# Patient Record
Sex: MALE | Race: WHITE | Employment: UNEMPLOYED | URBAN - METROPOLITAN AREA
[De-identification: names, ages, dates, MRNs, and addresses within clinical notes are randomized per-mention and may not be internally consistent; named-entity substitution may affect disease eponyms.]

---

## 2017-10-05 ENCOUNTER — HOSPITAL ENCOUNTER (OUTPATIENT)
Dept: CT IMAGING | Age: 62
Discharge: HOME | End: 2017-10-05
Attending: SURGERY
Payer: MEDICARE

## 2017-10-05 DIAGNOSIS — I65.29 OCCLUSION AND STENOSIS OF CAROTID ARTERY: ICD-10-CM

## 2017-10-05 DIAGNOSIS — I65.22 STENOSIS OF LEFT CAROTID ARTERY: ICD-10-CM

## 2017-10-05 PROBLEM — R53.1 LEFT-SIDED WEAKNESS: Status: ACTIVE | Noted: 2017-10-05

## 2017-10-05 PROBLEM — I10 HTN (HYPERTENSION): Status: ACTIVE | Noted: 2017-10-05

## 2017-10-05 PROBLEM — E11.9 DM (DIABETES MELLITUS) (HCC): Status: ACTIVE | Noted: 2017-10-05

## 2017-10-05 PROBLEM — I63.9 STROKE (HCC): Status: ACTIVE | Noted: 2017-10-05

## 2017-10-05 LAB — CREAT BLD-MCNC: 0.6 MG/DL (ref 0.8–1.5)

## 2017-10-05 PROCEDURE — 74011636320 HC RX REV CODE- 636/320: Performed by: SURGERY

## 2017-10-05 PROCEDURE — 70498 CT ANGIOGRAPHY NECK: CPT

## 2017-10-05 PROCEDURE — 74011000258 HC RX REV CODE- 258: Performed by: SURGERY

## 2017-10-05 PROCEDURE — 82565 ASSAY OF CREATININE: CPT

## 2017-10-05 RX ORDER — SODIUM CHLORIDE 0.9 % (FLUSH) 0.9 %
10 SYRINGE (ML) INJECTION
Status: COMPLETED | OUTPATIENT
Start: 2017-10-05 | End: 2017-10-05

## 2017-10-05 RX ADMIN — IOPAMIDOL 80 ML: 755 INJECTION, SOLUTION INTRAVENOUS at 14:23

## 2017-10-05 RX ADMIN — Medication 10 ML: at 14:23

## 2017-10-05 RX ADMIN — SODIUM CHLORIDE 100 ML: 900 INJECTION, SOLUTION INTRAVENOUS at 14:23

## 2017-10-10 ENCOUNTER — ANESTHESIA EVENT (OUTPATIENT)
Dept: SURGERY | Age: 62
DRG: 038 | End: 2017-10-10
Payer: MEDICARE

## 2017-10-10 ENCOUNTER — HOSPITAL ENCOUNTER (OUTPATIENT)
Dept: SURGERY | Age: 62
Discharge: HOME | End: 2017-10-10
Payer: MEDICARE

## 2017-10-10 VITALS
HEART RATE: 80 BPM | RESPIRATION RATE: 16 BRPM | TEMPERATURE: 99.8 F | DIASTOLIC BLOOD PRESSURE: 79 MMHG | HEIGHT: 69 IN | BODY MASS INDEX: 27.46 KG/M2 | OXYGEN SATURATION: 98 % | WEIGHT: 185.44 LBS | SYSTOLIC BLOOD PRESSURE: 140 MMHG

## 2017-10-10 LAB
ANION GAP SERPL CALC-SCNC: 6 MMOL/L (ref 7–16)
APTT PPP: 28.5 SEC (ref 23.5–31.7)
BUN SERPL-MCNC: 15 MG/DL (ref 8–23)
CALCIUM SERPL-MCNC: 9.5 MG/DL (ref 8.3–10.4)
CHLORIDE SERPL-SCNC: 107 MMOL/L (ref 98–107)
CO2 SERPL-SCNC: 27 MMOL/L (ref 21–32)
CREAT SERPL-MCNC: 0.53 MG/DL (ref 0.8–1.5)
ERYTHROCYTE [DISTWIDTH] IN BLOOD BY AUTOMATED COUNT: 12.6 % (ref 11.9–14.6)
GLUCOSE SERPL-MCNC: 58 MG/DL (ref 65–100)
HCT VFR BLD AUTO: 42.3 % (ref 41.1–50.3)
HGB BLD-MCNC: 13.9 G/DL (ref 13.6–17.2)
INR PPP: 1 (ref 0.9–1.2)
MCH RBC QN AUTO: 28.4 PG (ref 26.1–32.9)
MCHC RBC AUTO-ENTMCNC: 32.9 G/DL (ref 31.4–35)
MCV RBC AUTO: 86.3 FL (ref 79.6–97.8)
PLATELET # BLD AUTO: 356 K/UL (ref 150–450)
PMV BLD AUTO: 9.5 FL (ref 10.8–14.1)
POTASSIUM SERPL-SCNC: 4.1 MMOL/L (ref 3.5–5.1)
PROTHROMBIN TIME: 10.8 SEC (ref 9.6–12)
RBC # BLD AUTO: 4.9 M/UL (ref 4.23–5.67)
SODIUM SERPL-SCNC: 140 MMOL/L (ref 136–145)
WBC # BLD AUTO: 9 K/UL (ref 4.3–11.1)

## 2017-10-10 PROCEDURE — 93005 ELECTROCARDIOGRAM TRACING: CPT | Performed by: ANESTHESIOLOGY

## 2017-10-10 PROCEDURE — 85730 THROMBOPLASTIN TIME PARTIAL: CPT | Performed by: SURGERY

## 2017-10-10 PROCEDURE — 80048 BASIC METABOLIC PNL TOTAL CA: CPT | Performed by: SURGERY

## 2017-10-10 PROCEDURE — 85610 PROTHROMBIN TIME: CPT | Performed by: SURGERY

## 2017-10-10 PROCEDURE — 85027 COMPLETE CBC AUTOMATED: CPT | Performed by: SURGERY

## 2017-10-10 NOTE — ANESTHESIA PREPROCEDURE EVALUATION
Anesthetic History               Review of Systems / Medical History  Patient summary reviewed and pertinent labs reviewed    Pulmonary    COPD: moderate      Smoker (Continues to smoke)         Neuro/Psych       CVA (L sided weakness. Last CV 8/2017.  First CVA 2008)  Psychiatric history (Anxiety/Depression)     Cardiovascular    Hypertension: well controlled          PAD (Carotid stenosis) and hyperlipidemia    Exercise tolerance: <4 METS  Comments: Pt inactive  Denies CP   Poor balance per pt  Nml EF per note from general physician on recent ECHO   GI/Hepatic/Renal                Endo/Other    Diabetes: well controlled, type 2         Other Findings              Physical Exam    Airway  Mallampati: II  TM Distance: 4 - 6 cm  Neck ROM: normal range of motion   Mouth opening: Normal     Cardiovascular    Rhythm: regular  Rate: normal         Dental    Dentition: Poor dentition     Pulmonary  Breath sounds clear to auscultation               Abdominal  GI exam deferred       Other Findings            Anesthetic Plan    ASA: 4  Anesthesia type: general    Monitoring Plan: Arterial line      Induction: Intravenous  Anesthetic plan and risks discussed with: Patient

## 2017-10-10 NOTE — PERIOP NOTES
Patient verified name, , and surgery as listed in [facility]. Patient provided medical/health information and PTA medications to the best of their ability. TYPE  CASE:3  Orders per surgeon: were Received  Labs per surgeon: CBC,BMP,PT,PTT. Labs per anesthesia protocol: Type and Screen on DOS. EKG  :  Done today    Patient provided with and instructed on education handouts including Guide to Surgery, blood transfusions, pain management, and hand hygiene for the family and community, and [facility] brochure. Yovana Mist and instructions given per hospital policy. Instructed patient to continue previous medications as prescribed prior to surgery unless otherwise directed and to take the following medications the day of surgery according to anesthesia guidelines : Amlodipine, Aspirin, Carvedilol . Instructed patient to hold  the following medications: none. Original medication prescription bottles were visualized during patient appointment. Patient teach back successful and patient demonstrates knowledge of instruction.

## 2017-10-11 LAB
ATRIAL RATE: 74 BPM
CALCULATED P AXIS, ECG09: 71 DEGREES
CALCULATED R AXIS, ECG10: 31 DEGREES
CALCULATED T AXIS, ECG11: 32 DEGREES
DIAGNOSIS, 93000: NORMAL
P-R INTERVAL, ECG05: 134 MS
Q-T INTERVAL, ECG07: 380 MS
QRS DURATION, ECG06: 88 MS
QTC CALCULATION (BEZET), ECG08: 421 MS
VENTRICULAR RATE, ECG03: 74 BPM

## 2017-10-17 ENCOUNTER — HOSPITAL ENCOUNTER (INPATIENT)
Age: 62
LOS: 2 days | Discharge: HOME HEALTH | DRG: 038 | End: 2017-10-19
Attending: SURGERY | Admitting: SURGERY
Payer: MEDICARE

## 2017-10-17 ENCOUNTER — ANESTHESIA (OUTPATIENT)
Dept: SURGERY | Age: 62
DRG: 038 | End: 2017-10-17
Payer: MEDICARE

## 2017-10-17 PROBLEM — Z98.890 STATUS POST CAROTID ENDARTERECTOMY: Status: ACTIVE | Noted: 2017-10-17

## 2017-10-17 LAB
ABO + RH BLD: NORMAL
BLOOD GROUP ANTIBODIES SERPL: NORMAL
GLUCOSE BLD STRIP.AUTO-MCNC: 110 MG/DL (ref 65–100)
SPECIMEN EXP DATE BLD: NORMAL

## 2017-10-17 PROCEDURE — 77030010507 HC ADH SKN DERMBND J&J -B: Performed by: SURGERY

## 2017-10-17 PROCEDURE — 76010000174 HC OR TIME 3.5 TO 4 HR INTENSV-TIER 1: Performed by: SURGERY

## 2017-10-17 PROCEDURE — C1769 GUIDE WIRE: HCPCS | Performed by: SURGERY

## 2017-10-17 PROCEDURE — 03UJ0KZ SUPPLEMENT LEFT COMMON CAROTID ARTERY WITH NONAUTOLOGOUS TISSUE SUBSTITUTE, OPEN APPROACH: ICD-10-PCS | Performed by: SURGERY

## 2017-10-17 PROCEDURE — 86900 BLOOD TYPING SEROLOGIC ABO: CPT | Performed by: ANESTHESIOLOGY

## 2017-10-17 PROCEDURE — 77030005401 HC CATH RAD ARRO -A: Performed by: ANESTHESIOLOGY

## 2017-10-17 PROCEDURE — 65610000006 HC RM INTENSIVE CARE

## 2017-10-17 PROCEDURE — 03CN0Z6 EXTIRPATE MATTER FROM L EXT CAROTID, BIFURC, OPEN: ICD-10-PCS | Performed by: SURGERY

## 2017-10-17 PROCEDURE — 77030008477 HC STYL SATN SLP COVD -A: Performed by: ANESTHESIOLOGY

## 2017-10-17 PROCEDURE — 77030002519 HC INSRT CLMP FIBRA STLTH AMR -B: Performed by: SURGERY

## 2017-10-17 PROCEDURE — 77030013292 HC BOWL MX PRSM J&J -A: Performed by: ANESTHESIOLOGY

## 2017-10-17 PROCEDURE — 77030002987 HC SUT PROL J&J -B: Performed by: SURGERY

## 2017-10-17 PROCEDURE — 77030012406 HC DRN WND PENRS BARD -A: Performed by: SURGERY

## 2017-10-17 PROCEDURE — 77030011640 HC PAD GRND REM COVD -A: Performed by: SURGERY

## 2017-10-17 PROCEDURE — 77030008703 HC TU ET UNCUF COVD -A: Performed by: ANESTHESIOLOGY

## 2017-10-17 PROCEDURE — 74011250636 HC RX REV CODE- 250/636

## 2017-10-17 PROCEDURE — 77030032490 HC SLV COMPR SCD KNE COVD -B: Performed by: SURGERY

## 2017-10-17 PROCEDURE — 77030002986 HC SUT PROL J&J -A: Performed by: SURGERY

## 2017-10-17 PROCEDURE — 74011000250 HC RX REV CODE- 250

## 2017-10-17 PROCEDURE — C1768 GRAFT, VASCULAR: HCPCS | Performed by: SURGERY

## 2017-10-17 PROCEDURE — 77030018673: Performed by: SURGERY

## 2017-10-17 PROCEDURE — 77030002996 HC SUT SLK J&J -A: Performed by: SURGERY

## 2017-10-17 PROCEDURE — 03CJ0Z6 EXTIRPATE MATTER FROM L COM CAROTID, BIFURC, OPEN: ICD-10-PCS | Performed by: SURGERY

## 2017-10-17 PROCEDURE — 77030018824 HC SHNT CAR ARGY COVD -B: Performed by: SURGERY

## 2017-10-17 PROCEDURE — 77030010512 HC APPL CLP LIG J&J -C: Performed by: SURGERY

## 2017-10-17 PROCEDURE — 77030013968 HC SHNT CAR SUNDT INLC -D: Performed by: SURGERY

## 2017-10-17 PROCEDURE — 77030018836 HC SOL IRR NACL ICUM -A: Performed by: SURGERY

## 2017-10-17 PROCEDURE — 77030013794 HC KT TRNSDUC BLD EDWD -B: Performed by: ANESTHESIOLOGY

## 2017-10-17 PROCEDURE — 74011000250 HC RX REV CODE- 250: Performed by: SURGERY

## 2017-10-17 PROCEDURE — 77030031139 HC SUT VCRL2 J&J -A: Performed by: SURGERY

## 2017-10-17 PROCEDURE — 77030034888 HC SUT PROL 2 J&J -B: Performed by: SURGERY

## 2017-10-17 PROCEDURE — 77030019908 HC STETH ESOPH SIMS -A: Performed by: ANESTHESIOLOGY

## 2017-10-17 PROCEDURE — 74011250637 HC RX REV CODE- 250/637: Performed by: ANESTHESIOLOGY

## 2017-10-17 PROCEDURE — 74011250637 HC RX REV CODE- 250/637

## 2017-10-17 PROCEDURE — 74011000258 HC RX REV CODE- 258: Performed by: SURGERY

## 2017-10-17 PROCEDURE — 76060000038 HC ANESTHESIA 3.5 TO 4 HR: Performed by: SURGERY

## 2017-10-17 PROCEDURE — 74011250636 HC RX REV CODE- 250/636: Performed by: SURGERY

## 2017-10-17 PROCEDURE — 77030013567 HC DRN WND RESERV BARD -A: Performed by: SURGERY

## 2017-10-17 PROCEDURE — 76210000016 HC OR PH I REC 1 TO 1.5 HR: Performed by: SURGERY

## 2017-10-17 PROCEDURE — 77030014008 HC SPNG HEMSTAT J&J -C: Performed by: SURGERY

## 2017-10-17 PROCEDURE — 77030016570 HC BLNKT BAIR HGGR 3M -B: Performed by: ANESTHESIOLOGY

## 2017-10-17 PROCEDURE — 77030002970 HC SUT PLEDG TELE -A: Performed by: SURGERY

## 2017-10-17 PROCEDURE — 82962 GLUCOSE BLOOD TEST: CPT

## 2017-10-17 PROCEDURE — 74011250637 HC RX REV CODE- 250/637: Performed by: SURGERY

## 2017-10-17 PROCEDURE — 74011250636 HC RX REV CODE- 250/636: Performed by: ANESTHESIOLOGY

## 2017-10-17 PROCEDURE — 03CL0Z6 EXTIRPATE MATTER FROM L INT CAROTID, BIFURC, OPEN: ICD-10-PCS | Performed by: SURGERY

## 2017-10-17 PROCEDURE — 77030020782 HC GWN BAIR PAWS FLX 3M -B: Performed by: ANESTHESIOLOGY

## 2017-10-17 DEVICE — XENOSURE BIOLOGIC PATCH, 0.8CM X 8CM, EIFU
Type: IMPLANTABLE DEVICE | Site: NECK | Status: FUNCTIONAL
Brand: XENOSURE BIOLOGIC PATCH

## 2017-10-17 RX ORDER — SODIUM CHLORIDE 9 MG/ML
INJECTION, SOLUTION INTRAVENOUS
Status: DISCONTINUED | OUTPATIENT
Start: 2017-10-17 | End: 2017-10-17 | Stop reason: HOSPADM

## 2017-10-17 RX ORDER — ALBUTEROL SULFATE 90 UG/1
AEROSOL, METERED RESPIRATORY (INHALATION) AS NEEDED
Status: DISCONTINUED | OUTPATIENT
Start: 2017-10-17 | End: 2017-10-17 | Stop reason: HOSPADM

## 2017-10-17 RX ORDER — FAMOTIDINE 20 MG/1
20 TABLET, FILM COATED ORAL ONCE
Status: COMPLETED | OUTPATIENT
Start: 2017-10-17 | End: 2017-10-17

## 2017-10-17 RX ORDER — ACETAMINOPHEN 325 MG/1
650 TABLET ORAL
Status: DISCONTINUED | OUTPATIENT
Start: 2017-10-17 | End: 2017-10-19 | Stop reason: HOSPADM

## 2017-10-17 RX ORDER — HEPARIN SODIUM 1000 [USP'U]/ML
INJECTION, SOLUTION INTRAVENOUS; SUBCUTANEOUS AS NEEDED
Status: DISCONTINUED | OUTPATIENT
Start: 2017-10-17 | End: 2017-10-17 | Stop reason: HOSPADM

## 2017-10-17 RX ORDER — SODIUM CHLORIDE, SODIUM LACTATE, POTASSIUM CHLORIDE, CALCIUM CHLORIDE 600; 310; 30; 20 MG/100ML; MG/100ML; MG/100ML; MG/100ML
75 INJECTION, SOLUTION INTRAVENOUS CONTINUOUS
Status: DISCONTINUED | OUTPATIENT
Start: 2017-10-17 | End: 2017-10-17

## 2017-10-17 RX ORDER — DIPHENHYDRAMINE HYDROCHLORIDE 50 MG/ML
12.5 INJECTION, SOLUTION INTRAMUSCULAR; INTRAVENOUS
Status: DISCONTINUED | OUTPATIENT
Start: 2017-10-17 | End: 2017-10-17 | Stop reason: HOSPADM

## 2017-10-17 RX ORDER — HEPARIN SODIUM 5000 [USP'U]/ML
INJECTION, SOLUTION INTRAVENOUS; SUBCUTANEOUS AS NEEDED
Status: DISCONTINUED | OUTPATIENT
Start: 2017-10-17 | End: 2017-10-17 | Stop reason: HOSPADM

## 2017-10-17 RX ORDER — NEOSTIGMINE METHYLSULFATE 1 MG/ML
INJECTION INTRAVENOUS AS NEEDED
Status: DISCONTINUED | OUTPATIENT
Start: 2017-10-17 | End: 2017-10-17 | Stop reason: HOSPADM

## 2017-10-17 RX ORDER — DOPAMINE HYDROCHLORIDE 320 MG/100ML
5 INJECTION, SOLUTION INTRAVENOUS
Status: DISCONTINUED | OUTPATIENT
Start: 2017-10-17 | End: 2017-10-19

## 2017-10-17 RX ORDER — ONDANSETRON 2 MG/ML
INJECTION INTRAMUSCULAR; INTRAVENOUS AS NEEDED
Status: DISCONTINUED | OUTPATIENT
Start: 2017-10-17 | End: 2017-10-17 | Stop reason: HOSPADM

## 2017-10-17 RX ORDER — HYDROMORPHONE HYDROCHLORIDE 2 MG/ML
0.5 INJECTION, SOLUTION INTRAMUSCULAR; INTRAVENOUS; SUBCUTANEOUS
Status: DISCONTINUED | OUTPATIENT
Start: 2017-10-17 | End: 2017-10-17 | Stop reason: HOSPADM

## 2017-10-17 RX ORDER — SODIUM CHLORIDE 0.9 % (FLUSH) 0.9 %
5-10 SYRINGE (ML) INJECTION EVERY 8 HOURS
Status: DISCONTINUED | OUTPATIENT
Start: 2017-10-17 | End: 2017-10-19 | Stop reason: HOSPADM

## 2017-10-17 RX ORDER — ROCURONIUM BROMIDE 10 MG/ML
INJECTION, SOLUTION INTRAVENOUS AS NEEDED
Status: DISCONTINUED | OUTPATIENT
Start: 2017-10-17 | End: 2017-10-17 | Stop reason: HOSPADM

## 2017-10-17 RX ORDER — NICARDIPINE HYDROCHLORIDE 0.1 MG/ML
5-15 INJECTION INTRAVENOUS
Status: DISCONTINUED | OUTPATIENT
Start: 2017-10-17 | End: 2017-10-17 | Stop reason: SDUPTHER

## 2017-10-17 RX ORDER — ASPIRIN 81 MG/1
81 TABLET ORAL DAILY
Status: DISCONTINUED | OUTPATIENT
Start: 2017-10-18 | End: 2017-10-19 | Stop reason: HOSPADM

## 2017-10-17 RX ORDER — CEFAZOLIN SODIUM IN 0.9 % NACL 2 G/50 ML
2 INTRAVENOUS SOLUTION, PIGGYBACK (ML) INTRAVENOUS
Status: COMPLETED | OUTPATIENT
Start: 2017-10-17 | End: 2017-10-17

## 2017-10-17 RX ORDER — ACETAMINOPHEN 10 MG/ML
INJECTION, SOLUTION INTRAVENOUS AS NEEDED
Status: DISCONTINUED | OUTPATIENT
Start: 2017-10-17 | End: 2017-10-17 | Stop reason: HOSPADM

## 2017-10-17 RX ORDER — DEXTROSE MONOHYDRATE AND SODIUM CHLORIDE 5; .9 G/100ML; G/100ML
50 INJECTION, SOLUTION INTRAVENOUS CONTINUOUS
Status: DISCONTINUED | OUTPATIENT
Start: 2017-10-17 | End: 2017-10-19 | Stop reason: HOSPADM

## 2017-10-17 RX ORDER — SODIUM CHLORIDE, SODIUM LACTATE, POTASSIUM CHLORIDE, CALCIUM CHLORIDE 600; 310; 30; 20 MG/100ML; MG/100ML; MG/100ML; MG/100ML
75 INJECTION, SOLUTION INTRAVENOUS CONTINUOUS
Status: DISCONTINUED | OUTPATIENT
Start: 2017-10-17 | End: 2017-10-17 | Stop reason: HOSPADM

## 2017-10-17 RX ORDER — LIDOCAINE HYDROCHLORIDE 20 MG/ML
INJECTION, SOLUTION EPIDURAL; INFILTRATION; INTRACAUDAL; PERINEURAL AS NEEDED
Status: DISCONTINUED | OUTPATIENT
Start: 2017-10-17 | End: 2017-10-17 | Stop reason: HOSPADM

## 2017-10-17 RX ORDER — CARVEDILOL 25 MG/1
25 TABLET ORAL 2 TIMES DAILY WITH MEALS
Status: DISCONTINUED | OUTPATIENT
Start: 2017-10-17 | End: 2017-10-19 | Stop reason: HOSPADM

## 2017-10-17 RX ORDER — OXYCODONE HYDROCHLORIDE 5 MG/1
5 TABLET ORAL
Status: DISCONTINUED | OUTPATIENT
Start: 2017-10-17 | End: 2017-10-17 | Stop reason: HOSPADM

## 2017-10-17 RX ORDER — AMLODIPINE BESYLATE 10 MG/1
10 TABLET ORAL
Status: DISCONTINUED | OUTPATIENT
Start: 2017-10-18 | End: 2017-10-19 | Stop reason: HOSPADM

## 2017-10-17 RX ORDER — FENTANYL CITRATE 50 UG/ML
INJECTION, SOLUTION INTRAMUSCULAR; INTRAVENOUS AS NEEDED
Status: DISCONTINUED | OUTPATIENT
Start: 2017-10-17 | End: 2017-10-17 | Stop reason: HOSPADM

## 2017-10-17 RX ORDER — SODIUM CHLORIDE 0.9 % (FLUSH) 0.9 %
5-10 SYRINGE (ML) INJECTION AS NEEDED
Status: DISCONTINUED | OUTPATIENT
Start: 2017-10-17 | End: 2017-10-19 | Stop reason: HOSPADM

## 2017-10-17 RX ORDER — NALOXONE HYDROCHLORIDE 0.4 MG/ML
0.1 INJECTION, SOLUTION INTRAMUSCULAR; INTRAVENOUS; SUBCUTANEOUS
Status: DISCONTINUED | OUTPATIENT
Start: 2017-10-17 | End: 2017-10-17 | Stop reason: HOSPADM

## 2017-10-17 RX ORDER — ONDANSETRON 2 MG/ML
4 INJECTION INTRAMUSCULAR; INTRAVENOUS
Status: DISCONTINUED | OUTPATIENT
Start: 2017-10-17 | End: 2017-10-19 | Stop reason: HOSPADM

## 2017-10-17 RX ORDER — PROPOFOL 10 MG/ML
INJECTION, EMULSION INTRAVENOUS AS NEEDED
Status: DISCONTINUED | OUTPATIENT
Start: 2017-10-17 | End: 2017-10-17 | Stop reason: HOSPADM

## 2017-10-17 RX ORDER — OXYCODONE HYDROCHLORIDE 5 MG/1
10 TABLET ORAL
Status: DISCONTINUED | OUTPATIENT
Start: 2017-10-17 | End: 2017-10-17 | Stop reason: HOSPADM

## 2017-10-17 RX ORDER — LIDOCAINE HYDROCHLORIDE 10 MG/ML
0.1 INJECTION INFILTRATION; PERINEURAL AS NEEDED
Status: DISCONTINUED | OUTPATIENT
Start: 2017-10-17 | End: 2017-10-19 | Stop reason: HOSPADM

## 2017-10-17 RX ORDER — MORPHINE SULFATE 10 MG/ML
5 INJECTION, SOLUTION INTRAMUSCULAR; INTRAVENOUS
Status: DISCONTINUED | OUTPATIENT
Start: 2017-10-17 | End: 2017-10-19 | Stop reason: HOSPADM

## 2017-10-17 RX ORDER — FLUMAZENIL 0.1 MG/ML
0.2 INJECTION INTRAVENOUS AS NEEDED
Status: DISCONTINUED | OUTPATIENT
Start: 2017-10-17 | End: 2017-10-17 | Stop reason: HOSPADM

## 2017-10-17 RX ORDER — NALOXONE HYDROCHLORIDE 0.4 MG/ML
0.4 INJECTION, SOLUTION INTRAMUSCULAR; INTRAVENOUS; SUBCUTANEOUS AS NEEDED
Status: DISCONTINUED | OUTPATIENT
Start: 2017-10-17 | End: 2017-10-19 | Stop reason: HOSPADM

## 2017-10-17 RX ORDER — MIDAZOLAM HYDROCHLORIDE 1 MG/ML
2 INJECTION, SOLUTION INTRAMUSCULAR; INTRAVENOUS
Status: DISCONTINUED | OUTPATIENT
Start: 2017-10-17 | End: 2017-10-19 | Stop reason: HOSPADM

## 2017-10-17 RX ORDER — CEFAZOLIN SODIUM IN 0.9 % NACL 2 G/50 ML
2 INTRAVENOUS SOLUTION, PIGGYBACK (ML) INTRAVENOUS EVERY 8 HOURS
Status: COMPLETED | OUTPATIENT
Start: 2017-10-17 | End: 2017-10-18

## 2017-10-17 RX ORDER — BUPIVACAINE HYDROCHLORIDE 2.5 MG/ML
INJECTION, SOLUTION EPIDURAL; INFILTRATION; INTRACAUDAL AS NEEDED
Status: DISCONTINUED | OUTPATIENT
Start: 2017-10-17 | End: 2017-10-17 | Stop reason: HOSPADM

## 2017-10-17 RX ORDER — PROTAMINE SULFATE 10 MG/ML
INJECTION, SOLUTION INTRAVENOUS AS NEEDED
Status: DISCONTINUED | OUTPATIENT
Start: 2017-10-17 | End: 2017-10-17 | Stop reason: HOSPADM

## 2017-10-17 RX ORDER — GLYCOPYRROLATE 0.2 MG/ML
INJECTION INTRAMUSCULAR; INTRAVENOUS AS NEEDED
Status: DISCONTINUED | OUTPATIENT
Start: 2017-10-17 | End: 2017-10-17 | Stop reason: HOSPADM

## 2017-10-17 RX ORDER — LISINOPRIL 5 MG/1
5 TABLET ORAL
Status: DISCONTINUED | OUTPATIENT
Start: 2017-10-18 | End: 2017-10-19 | Stop reason: HOSPADM

## 2017-10-17 RX ORDER — OXYCODONE AND ACETAMINOPHEN 5; 325 MG/1; MG/1
1 TABLET ORAL
Status: DISCONTINUED | OUTPATIENT
Start: 2017-10-17 | End: 2017-10-19 | Stop reason: HOSPADM

## 2017-10-17 RX ADMIN — GLYCOPYRROLATE 0.2 MG: 0.2 INJECTION INTRAMUSCULAR; INTRAVENOUS at 14:31

## 2017-10-17 RX ADMIN — PROPOFOL 50 MG: 10 INJECTION, EMULSION INTRAVENOUS at 11:25

## 2017-10-17 RX ADMIN — DEXTROSE MONOHYDRATE AND SODIUM CHLORIDE 50 ML/HR: 5; .9 INJECTION, SOLUTION INTRAVENOUS at 17:55

## 2017-10-17 RX ADMIN — OXYCODONE HYDROCHLORIDE AND ACETAMINOPHEN 1 TABLET: 5; 325 TABLET ORAL at 21:58

## 2017-10-17 RX ADMIN — ROCURONIUM BROMIDE 40 MG: 10 INJECTION, SOLUTION INTRAVENOUS at 11:25

## 2017-10-17 RX ADMIN — HYDROMORPHONE HYDROCHLORIDE 0.5 MG: 2 INJECTION, SOLUTION INTRAMUSCULAR; INTRAVENOUS; SUBCUTANEOUS at 15:25

## 2017-10-17 RX ADMIN — FENTANYL CITRATE 50 MCG: 50 INJECTION, SOLUTION INTRAMUSCULAR; INTRAVENOUS at 12:52

## 2017-10-17 RX ADMIN — NEOSTIGMINE METHYLSULFATE 1.5 MG: 1 INJECTION INTRAVENOUS at 14:31

## 2017-10-17 RX ADMIN — FENTANYL CITRATE 50 MCG: 50 INJECTION, SOLUTION INTRAMUSCULAR; INTRAVENOUS at 11:56

## 2017-10-17 RX ADMIN — HEPARIN SODIUM 8000 UNITS: 1000 INJECTION, SOLUTION INTRAVENOUS; SUBCUTANEOUS at 12:22

## 2017-10-17 RX ADMIN — SODIUM CHLORIDE: 9 INJECTION, SOLUTION INTRAVENOUS at 11:30

## 2017-10-17 RX ADMIN — FENTANYL CITRATE 50 MCG: 50 INJECTION, SOLUTION INTRAMUSCULAR; INTRAVENOUS at 11:24

## 2017-10-17 RX ADMIN — Medication 10 ML: at 21:58

## 2017-10-17 RX ADMIN — Medication 10 ML: at 17:56

## 2017-10-17 RX ADMIN — ONDANSETRON 4 MG: 2 INJECTION INTRAMUSCULAR; INTRAVENOUS at 14:19

## 2017-10-17 RX ADMIN — CEFAZOLIN 2 G: 1 INJECTION, POWDER, FOR SOLUTION INTRAMUSCULAR; INTRAVENOUS; PARENTERAL at 11:32

## 2017-10-17 RX ADMIN — HEPARIN SODIUM 4000 UNITS: 1000 INJECTION, SOLUTION INTRAVENOUS; SUBCUTANEOUS at 13:07

## 2017-10-17 RX ADMIN — PROPOFOL 100 MG: 10 INJECTION, EMULSION INTRAVENOUS at 11:24

## 2017-10-17 RX ADMIN — PROTAMINE SULFATE 50 MG: 10 INJECTION, SOLUTION INTRAVENOUS at 14:05

## 2017-10-17 RX ADMIN — LIDOCAINE HYDROCHLORIDE 60 MG: 20 INJECTION, SOLUTION EPIDURAL; INFILTRATION; INTRACAUDAL; PERINEURAL at 11:24

## 2017-10-17 RX ADMIN — ACETAMINOPHEN 1000 MG: 10 INJECTION, SOLUTION INTRAVENOUS at 14:13

## 2017-10-17 RX ADMIN — SODIUM CHLORIDE, SODIUM LACTATE, POTASSIUM CHLORIDE, AND CALCIUM CHLORIDE 75 ML/HR: 600; 310; 30; 20 INJECTION, SOLUTION INTRAVENOUS at 09:16

## 2017-10-17 RX ADMIN — OXYCODONE HYDROCHLORIDE 10 MG: 5 TABLET ORAL at 16:53

## 2017-10-17 RX ADMIN — PROPOFOL 50 MG: 10 INJECTION, EMULSION INTRAVENOUS at 11:26

## 2017-10-17 RX ADMIN — ROCURONIUM BROMIDE 10 MG: 10 INJECTION, SOLUTION INTRAVENOUS at 11:28

## 2017-10-17 RX ADMIN — FENTANYL CITRATE 50 MCG: 50 INJECTION, SOLUTION INTRAMUSCULAR; INTRAVENOUS at 11:19

## 2017-10-17 RX ADMIN — CEFAZOLIN 2 G: 1 INJECTION, POWDER, FOR SOLUTION INTRAMUSCULAR; INTRAVENOUS; PARENTERAL at 20:16

## 2017-10-17 RX ADMIN — FAMOTIDINE 20 MG: 20 TABLET ORAL at 09:17

## 2017-10-17 RX ADMIN — ALBUTEROL SULFATE 3 PUFF: 90 AEROSOL, METERED RESPIRATORY (INHALATION) at 14:34

## 2017-10-17 RX ADMIN — SODIUM CHLORIDE: 9 INJECTION, SOLUTION INTRAVENOUS at 14:10

## 2017-10-17 NOTE — ANESTHESIA PROCEDURE NOTES
Arterial Line Placement    Start time: 10/17/2017 11:18 AM  End time: 10/17/2017 11:21 AM  Performed by: Naomi Encinas  Authorized by: Naomi Encinas     Pre-Procedure  Indications:  Arterial pressure monitoring  Preanesthetic Checklist: patient identified, risks and benefits discussed, anesthesia consent, site marked, patient being monitored, timeout performed and patient being monitored    Timeout Time: 11:17        Procedure:   Prep:  Chlorhexidine  Seldinger Technique?: Yes    Orientation:  Left  Location:  Radial artery  Catheter size:  20 G  Number of attempts:  1  Cont Cardiac Output Sensor: No      Assessment:   Post-procedure:  Line secured and sterile dressing applied  Patient Tolerance:  Patient tolerated the procedure well with no immediate complications

## 2017-10-17 NOTE — OP NOTES
[facility]   OPERATIVE REPORT       Name:  Shirley Montero   MR#:  [id number]   :  1955   Account #:  [de-identified]   Date of Adm:  10/17/2017       DATE OF PROCEDURE: 10/17/2017     PREOPERATIVE DIAGNOSIS: Left carotid stenosis. POSTOPERATIVE DIAGNOSIS: Left carotid stenosis. PROCEDURE: Left carotid endarterectomy with bovine pericardial   patch angioplasty. SURGEON: Zaire Walker MD.     [facility]. ANESTHESIA: General endotracheal.    ESTIMATED BLOOD LOSS: 100 mL. DRAINS: A #15 GERMAINE. SPECIMENS: None. COMPLICATIONS: None. DESCRIPTION OF PROCEDURE: The patient was brought to the   operating room and placed on the operating table in supine   position. Following adequate general endotracheal anesthesia and   a time-out procedure, left neck was draped and prepped in   sterile fashion. An oblique incision was made along the anterior   border of the sternocleidomastoid muscle. The wound was deepened   using Bovie to control bleeding. The platysma was divided   throughout the length of the wound. The dissection was carried   down to the level of the internal jugular vein. The facial   branch of the internal jugular vein was then divided between   silk ties. Next, the carotid sheath was entered. The common   carotid, internal carotid, and external carotid arteries were   identified, mobilized, encircled with vessel loops. The plaque   extending distally in the internal carotid artery extended for   approximately 5 cm beyond the bifurcation requiring division of   the digastric muscle for exposure. The hypoglossal nerve was   identified during this procedure and was encircled with vessel   loop, so that we would not lose track of its positioning during   the procedure. The patient was then systemically heparinized. Next, the ICA, ECA and common carotid arteries were sequentially   occluded.  A longitudinal arteriotomy was performed of the common   carotid artery and extended across the bifurcation onto the   internal carotid artery. The arteriotomy was extended for   several centimeters to the point where we were beyond the level   of significant plaque. A 10-[place] shunt was then placed   in the internal carotid artery and then proximally in the common   carotid artery. Next, the plaque was then mobilized   circumferentially and divided at the proximal end of the   arteriotomy. The plaque was then mobilized moving proximal to   distal. The plaque extending into the external carotid artery   was removed the eversion technique. The remainder of the plaque   was mobilized distally to the point where we a smooth endpoint   was achieved. The endpoint was secured with interrupted 7-0   tacking sutures. The endarterectomy site was then manually   debrided of all movable debris. Once I was satisfied there was   no further debris present, the bovine pericardial patch was   brought onto the field and sewn in position in end-to-side   fashion with running 6-0 Prolene suture, brought out from either   end. Prior to completion of the closure, the shunt was removed   and native vessels were flushed and backbled. The closure was   then completed and flow was restored to the external carotid   artery, and then to the internal carotid artery. Flow was   confirmed by Doppler. At this point, protamine was administered,   and hemostasis was achieved. A 15 GERMAINE drain was placed. The wound   was then irrigated again and inspected for bleeding and there   was none seen. The wound was then closed in layers of Vicryl   suture. Sterile dry dressings were applied. The patient was   awakened from anesthesia and transferred to the recovery room in   stable condition. The patient tolerated the procedure well. No   complications. All needle and sponge counts were correct.         Wynelle Severin, MD Dan Jumper / Monisha Barragan   D:  10/17/2017   14:58   T:  10/17/2017   15:18   Job #:  [id number]

## 2017-10-17 NOTE — PERIOP NOTES
Type and screen confirmation drawn by Willy Angry, PCT and hand transported to blood bank. Per [place] in the Blood Bank, no Blood Band # required on label for type and screen confirmation tube.

## 2017-10-17 NOTE — PROGRESS NOTES
Dual skin assessment completed - left neck incision with drsg c/d/i with GERMAINE bulb charged. Left lateral upper arm with scab and portion with scab removed - pt states he fell at home.

## 2017-10-17 NOTE — PROGRESS NOTES
TRANSFER - IN REPORT:    Verbal report received from Ion Spring RN on [employer]  being received from PACU for routine progression of care      Report consisted of patients Situation, Background, Assessment and   Recommendations(SBAR). Information from the following report(s) SBAR, Kardex, OR Summary, Procedure Summary, Intake/Output, MAR, Accordion, Recent Results, Med Rec Status and Cardiac Rhythm NSR was reviewed with the receiving nurse. Opportunity for questions and clarification was provided. Assessment completed upon patients arrival to unit and care assumed.

## 2017-10-17 NOTE — IP AVS SNAPSHOT
[address] 
[phone] Patient: Mauricio Tse MRN: [id number] QWE:[id number] Current Discharge Medication List  
  
START taking these medications Dose & Instructions Dispensing Information Comments Morning Noon Evening Bedtime  
 nicotine 21 mg/24 hr  
Commonly known as:  Qian Brewster Start taking on:  10/20/2017 Your last dose was: This morning 10/19 Your next dose is:  Tomorrow morning 10/20 Notes to Patient: This is for smoking cessation Dose:  1 Patch 1 Patch by TransDERmal route daily for 30 days. Quantity:  30 Patch Refills:  0  
     
   
   
   
  
 traMADol 50 mg tablet Commonly known as:  ULTRAM  
Your next dose is: Take today if needed Dose:  50 mg Take 1 Tab by mouth every six (6) hours as needed for Pain. Max Daily Amount: 200 mg. Quantity:  30 Tab Refills:  0 CONTINUE these medications which have NOT CHANGED Dose & Instructions Dispensing Information Comments Morning Noon Evening Bedtime  
 albuterol 90 mcg/actuation inhaler Commonly known as:  PROVENTIL HFA, VENTOLIN HFA, PROAIR HFA Your next dose is:  Resume home schedule Take  by inhalation. Refills:  0 AMARYL 2 mg tablet Generic drug:  glimepiride Your last dose was: This morning 10/19 Your next dose is:  Tomorrow morning 10/20 Take  by mouth every morning. Refills:  0  
     
  
   
   
   
  
 amLODIPine 10 mg tablet Commonly known as:  Ila Colon Your last dose was: This morning 10/19 Your next dose is:  Tomorrow morning 10/20 Dose:  10 mg Take 10 mg by mouth every morning. Refills:  0  
     
  
   
   
   
  
 aspirin delayed-release 81 mg tablet Your last dose was: This morning 10/19 Your next dose is:  Tomorrow morning 10/20 Dose:  81 mg Take 81 mg by mouth every morning. Refills:  0 COREG 25 mg tablet Generic drug:  carvedilol Your last dose was: This morning 10/19 Your next dose is: This evening 10/19 Dose:  25 mg Take 25 mg by mouth two (2) times daily (with meals). Refills:  0  
     
  
   
   
  
   
  
 gabapentin 300 mg capsule Commonly known as:  NEURONTIN Your next dose is: Take today if needed Dose:  300 mg Take 300 mg by mouth as needed. Refills:  0 LIPITOR 40 mg tablet Generic drug:  atorvastatin Your last dose was: Last night 10/18 Your next dose is: Take tonight 10/19 Dose:  40 mg Take 40 mg by mouth nightly. Refills:  0  
     
   
   
   
  
  
 lisinopril 5 mg tablet Commonly known as:  Robertha Roup Your last dose was: This morning 10/19 Your next dose is:  Tomorrow morning 10/20 Dose:  5 mg Take 5 mg by mouth every morning. Refills:  0  
     
  
   
   
   
  
 metFORMIN 500 mg tablet Commonly known as:  GLUCOPHAGE Your last dose was: This morning 10/19 Your next dose is: This evening 10/19 Dose:  1000 mg Take 1,000 mg by mouth two (2) times daily (with meals). Refills:  0 NORCO 7.5-325 mg per tablet Generic drug:  HYDROcodone-acetaminophen Notes to Patient:  DO NOT TAKE WITH PERCOCET Dose:  1 Tab Take 1 Tab by mouth every six (6) hours as needed. Refills:  0  
     
   
   
   
  
 ZANAFLEX 4 mg capsule Generic drug:  tiZANidine Your next dose is: Take today if needed Dose:  4 mg Take 4 mg by mouth as needed. Refills:  0 Where to Get Your Medications Information on where to get these meds will be given to you by the nurse or doctor. ! Ask your nurse or doctor about these medications  
  nicotine 21 mg/24 hr  
 traMADol 50 mg tablet

## 2017-10-17 NOTE — PROGRESS NOTES
Bedside shift change report given to [employer] (oncoming nurse) by Holly Coley RN   (offgoing nurse). Report included the following information SBAR, Kardex, OR Summary, Intake/Output, MAR, Accordion, Recent Results, Med Rec Status and Cardiac Rhythm NSR.

## 2017-10-17 NOTE — PERIOP NOTES
Patient has not voided since before surgery, unable to void after several attempts. Inserted campos per standing order.

## 2017-10-17 NOTE — ANESTHESIA PROCEDURE NOTES
Arterial Line Placement    Start time: 10/17/2017 11:23 AM  End time: 10/17/2017 11:25 AM  Performed by: Tia Hatfield by: Sandra Owens     Pre-Procedure  Preanesthetic Checklist: patient identified, risks and benefits discussed, anesthesia consent, site marked, patient being monitored, timeout performed and patient being monitored    Timeout Time: 11:23        Procedure:   Prep:  Alcohol and ChloraPrep  Seldinger Technique?: No    Orientation:  Left  Location:  Radial artery  Catheter size:  20 G  Number of attempts:  1  Cont Cardiac Output Sensor: No      Assessment:   Post-procedure:  Line secured and sterile dressing applied  Patient Tolerance:  Patient tolerated the procedure well with no immediate complications  Comment:   Placed by CONCHIS

## 2017-10-17 NOTE — ANESTHESIA POSTPROCEDURE EVALUATION
Post-Anesthesia Evaluation and Assessment    Patient: Makeda Fam MRN: [id number]  SSN: [id number]    YOB: 1955  Age: 60 y.o. Sex: male       Cardiovascular Function/Vital Signs  Visit Vitals    BP (P) 111/62 (BP 1 Location: Right arm, BP Patient Position: At rest;Supine)    Pulse 69    Temp (P) 36.9 °C (98.5 °F)    Resp 18    Ht 5' 9\" (1.753 m)    Wt 82.2 kg (181 lb 3.2 oz)    SpO2 97%    BMI 26.76 kg/m2       Patient is status post general anesthesia for Procedure(s):  LEFT CAROTID ENDARTERECTOMY. Nausea/Vomiting: None    Postoperative hydration reviewed and adequate. Pain:  Pain Scale 1: Numeric (0 - 10) (10/17/17 1456)  Pain Intensity 1: 0 (10/17/17 1456)   Managed    Neurological Status:   Neuro (WDL): Exceptions to WDL (10/17/17 1456)  Neuro  Neurologic State: Drowsy (10/17/17 1456)  LUE Motor Response: Purposeful (10/17/17 1456)  LLE Motor Response: Purposeful (10/17/17 1456)  RUE Motor Response: Purposeful (10/17/17 1456)  RLE Motor Response: Purposeful (10/17/17 1456)   At baseline    Mental Status and Level of Consciousness: Arousable    Pulmonary Status:   O2 Device: (P) Nasal cannula (10/17/17 1555)   Adequate oxygenation and airway patent    Complications related to anesthesia: None    Post-anesthesia assessment completed.  No concerns    Signed By: Leonel Bateman MD     October 17, 2017

## 2017-10-17 NOTE — BRIEF OP NOTE
BRIEF OPERATIVE NOTE    Date of Procedure: 10/17/2017   Preoperative Diagnosis: Carotid stenosis, left [I65.22]  Postoperative Diagnosis: Carotid stenosis, left [I65.22]    Procedure(s):  LEFT CAROTID ENDARTERECTOMY WITH PATCH ANGIOPLASTY  Surgeon(s) and Role:     * Ashwini Hale MD - Primary     * Arun Caldwell MD - Assisting         Assistant Staff:       Surgical Staff:  Circ-1: Cathie Burns RN  Circ-Relief: Timothy Barker RN  Scrub Tech-1: Herb [place]  Scrub Tech-2: Excell Serve Kwong  Scrub Tech-Relief: Elham Claire Pyhala  Event Time In   Incision Start 1155   Incision Close      Anesthesia: General   Estimated Blood Loss: 100CC  Specimens: * No specimens in log *   Findings:    Complications: NONE  Implants:   Implant Name Type Inv.  Item Serial No.  Lot No. LRB No. Used [facility] VASC PERIPATCH [id number] -- Taylor Chaney - [id number]   PATCH VASC PERIPATCH [id number] -- [employer] [facility] [employer] [id number] Left 1 Implanted

## 2017-10-17 NOTE — PERIOP NOTES
TRANSFER - OUT REPORT:    Verbal report given to Andrez Franco RN  on [employer]  being transferred to [facility]  for routine progression of care       Report consisted of patients Situation, Background, Assessment and   Recommendations(SBAR). Information from the following report(s) SBAR, OR Summary and MAR was reviewed with the receiving nurse. Lines:   Peripheral IV 10/17/17 Right Hand (Active)   Site Assessment Clean, dry, & intact 10/17/2017  3:55 PM   Phlebitis Assessment 0 10/17/2017  3:55 PM   Infiltration Assessment 0 10/17/2017  3:55 PM   Dressing Status Clean, dry, & intact 10/17/2017  3:55 PM   Dressing Type Transparent;Tape 10/17/2017  3:55 PM   Hub Color/Line Status Capped;Green 10/17/2017  3:55 PM       Peripheral IV 10/17/17 Left Forearm (Active)   Site Assessment Clean, dry, & intact 10/17/2017  3:55 PM   Phlebitis Assessment 0 10/17/2017  3:55 PM   Infiltration Assessment 0 10/17/2017  3:55 PM   Dressing Status Clean, dry, & intact 10/17/2017  3:55 PM   Dressing Type Transparent;Tape 10/17/2017  3:55 PM   Hub Color/Line Status Infusing;Green 10/17/2017  3:55 PM       Arterial Line 10/17/17 Left Radial artery (Active)   Dressing Status Clean, dry, & intact 10/17/2017  3:55 PM   Dressing Type Transparent;Tape 10/17/2017  3:55 PM   Line Status Intact and in place 10/17/2017  3:55 PM   Treatment Zeroed or re-zeroed 10/17/2017  3:55 PM        Opportunity for questions and clarification was provided. Patient transported with:   O2 @ 3 liters    VTE prophylaxis orders have been written for [employer]. Patient and family given floor number and nurses name. Family updated re: pt status after security code verified.

## 2017-10-17 NOTE — IP AVS SNAPSHOT
[address] 
[phone] Patient: Erika Wilder MRN: [id number] DANIKA:[birth year] You are allergic to the following Allergen Reactions Pravastatin Nausea and Vomiting Immunizations Administered for This Admission Name Date Influenza Vaccine (Quad) PF 10/19/2017 Recent Documentation Height Weight BMI Smoking Status 1.753 m 82.2 kg 26.76 kg/m2 Current Every Day Smoker Emergency Contacts Name Discharge Info Relation Home Work Mobile Irene Moise  Sister [23] [phone] About your hospitalization You were admitted on:  October 17, 2017 You last received care in the:  [facility] 7 MED SURG You were discharged on:  October 19, 2017 Unit phone number:  [phone] Why you were hospitalized Your primary diagnosis was:  Carotid Stenosis Your diagnoses also included:  Status Post Carotid Endarterectomy, Dm (Diabetes Mellitus) (Hcc), Htn (Hypertension) Providers Seen During Your Hospitalizations Provider Role Specialty Primary office phone Jaime Dodge MD Attending Provider Vascular Surgery [phone] Your Primary Care Physician (PCP) Primary Care Physician Office Phone Office Fax OTHER, PHYS ** None ** ** None ** Follow-up Information Follow up With Details Comments Contact Info Jaime Dodge MD On 11/16/2017 follow up appointment at 1 : 00 PM [address] [facility] [address] 
[phone] [address]  Will contact you to schedule first home visit [facility] [address] [facility] [address] 
[phone] Your Appointments Thursday November 16, 2017  1:00 PM EST Carotid Artery Ultrasound with [facility] ([facility] 0464 [facility] [address] [place] [id number] [phone] Thursday November 16, 2017  1:45 PM EST Global Post Op with Grisel Childers MD  
[facility] ([facility]) [address] [place] [id number] [phone] Current Discharge Medication List  
  
START taking these medications Dose & Instructions Dispensing Information Comments Morning Noon Evening Bedtime  
 nicotine 21 mg/24 hr  
Commonly known as:  Suma Gabriel Start taking on:  10/20/2017 Your last dose was: This morning 10/19 Your next dose is:  Tomorrow morning 10/20 Notes to Patient: This is for smoking cessation Dose:  1 Patch 1 Patch by TransDERmal route daily for 30 days. Quantity:  30 Patch Refills:  0  
     
   
   
   
  
 traMADol 50 mg tablet Commonly known as:  ULTRAM  
Your next dose is: Take today if needed Dose:  50 mg Take 1 Tab by mouth every six (6) hours as needed for Pain. Max Daily Amount: 200 mg. Quantity:  30 Tab Refills:  0 CONTINUE these medications which have NOT CHANGED Dose & Instructions Dispensing Information Comments Morning Noon Evening Bedtime  
 albuterol 90 mcg/actuation inhaler Commonly known as:  PROVENTIL HFA, VENTOLIN HFA, PROAIR HFA Your next dose is:  Resume home schedule Take  by inhalation. Refills:  0 AMARYL 2 mg tablet Generic drug:  glimepiride Your last dose was: This morning 10/19 Your next dose is:  Tomorrow morning 10/20 Take  by mouth every morning. Refills:  0  
     
  
   
   
   
  
 amLODIPine 10 mg tablet Commonly known as:  Sadaf Marques Your last dose was: This morning 10/19 Your next dose is:  Tomorrow morning 10/20 Dose:  10 mg Take 10 mg by mouth every morning. Refills:  0  
     
  
   
   
   
  
 aspirin delayed-release 81 mg tablet Your last dose was: This morning 10/19 Your next dose is:  Tomorrow morning 10/20 Dose:  81 mg Take 81 mg by mouth every morning. Refills:  0 COREG 25 mg tablet Generic drug:  carvedilol Your last dose was: This morning 10/19 Your next dose is: This evening 10/19 Dose:  25 mg Take 25 mg by mouth two (2) times daily (with meals). Refills:  0  
     
  
   
   
  
   
  
 gabapentin 300 mg capsule Commonly known as:  NEURONTIN Your next dose is: Take today if needed Dose:  300 mg Take 300 mg by mouth as needed. Refills:  0 LIPITOR 40 mg tablet Generic drug:  atorvastatin Your last dose was: Last night 10/18 Your next dose is: Take tonight 10/19 Dose:  40 mg Take 40 mg by mouth nightly. Refills:  0  
     
   
   
   
  
  
 lisinopril 5 mg tablet Commonly known as:  Ashley Loss Your last dose was: This morning 10/19 Your next dose is:  Tomorrow morning 10/20 Dose:  5 mg Take 5 mg by mouth every morning. Refills:  0  
     
  
   
   
   
  
 metFORMIN 500 mg tablet Commonly known as:  GLUCOPHAGE Your last dose was: This morning 10/19 Your next dose is: This evening 10/19 Dose:  1000 mg Take 1,000 mg by mouth two (2) times daily (with meals). Refills:  0 NORCO 7.5-325 mg per tablet Generic drug:  HYDROcodone-acetaminophen Notes to Patient:  DO NOT TAKE WITH PERCOCET Dose:  1 Tab Take 1 Tab by mouth every six (6) hours as needed. Refills:  0  
     
   
   
   
  
 ZANAFLEX 4 mg capsule Generic drug:  tiZANidine Your next dose is: Take today if needed Dose:  4 mg Take 4 mg by mouth as needed. Refills:  0 Where to Get Your Medications Information on where to get these meds will be given to you by the nurse or doctor. ! Ask your nurse or doctor about these medications  
  nicotine 21 mg/24 hr  
 traMADol 50 mg tablet Discharge Instructions Report to Dr Yenifer Danielson temp 100.5 or greater , redness, swelling or drainage from incision Dressing:may remove dressing tomorrow and leave incision open to air May shower but no tub bathing No heavy lifting or strenuous activity for 6 weeks No driving until directed by doctor Notify surgeon of  pain not controlled by pain medication Carotid Endarterectomy: What to Expect at [facility] Your Recovery A carotid endarterectomy (say \"kuh-RAW-melvind pv-gbs-fgh-REK-tuh-sara\") is surgery to remove fatty build-up (plaque) from one of the carotid arteries. There are two carotid arteriesone on each side of the neckthat supply blood to the brain. When plaque builds up in either artery, it can make it hard for blood to flow to the brain. This surgery may lower your risk of having a stroke. You may have a sore throat for a few days. You can expect the cut (incision) in your neck to be sore for about a week. The area around the incision may also be swollen and bruised at first. The area in front of the incision may be numb. This usually gets better after 6 to 12 months. Your doctor closed the incision in your neck with stitches. The stitches will be removed 7 to 10 days after surgery, or you may have stitches that dissolve on their own. You may feel more tired than usual for several weeks after surgery. You will probably be able to go back to work or your usual activities in 1 to 2 weeks. This care sheet gives you a general idea about how long it will take for you to recover. But each person recovers at a different pace. Follow the steps below to feel better as quickly as possible. How can you care for yourself at home? Activity · Rest when you feel tired. Getting enough sleep will help you recover. · Try to walk each day. Start by walking a little more than you did the day before. Bit by bit, increase the amount you walk. Walking boosts blood flow and helps prevent pneumonia and constipation. · Avoid strenuous activities, such as bicycle riding, jogging, weight lifting, or aerobic exercise. Your doctor will tell you when it's okay to do strenuous activity. · For 1 to 2 weeks, avoid lifting anything that would make you strain. This may include a child, heavy grocery bags and milk containers, a heavy briefcase or backpack, cat litter or dog food bags, or a vacuum . · Ask your doctor when you can drive again. · You will probably need to take 1 to 2 weeks off from work. It depends on the type of work you do and how you feel. · You may shower and take baths as usual. But do not soak the incision for the first 2 weeks, or until your doctor tells you it is okay. Pat the incision dry. · Your doctor will tell you when you can have sex again. Diet · You can eat your normal diet. If your stomach is upset, try bland, low-fat foods like plain rice, broiled chicken, toast, and yogurt. · Drink plenty of fluids (unless your doctor tells you not to). · You may notice that your bowel movements are not regular right after your surgery. This is common. Try to avoid constipation and straining with bowel movements. You may want to take a fiber supplement every day. If you have not had a bowel movement after a couple of days, ask your doctor about taking a mild laxative. Medicines · Your doctor will tell you if and when you can restart your medicines. He or she will also give you instructions about taking any new medicines. · If you take blood thinners, such as warfarin (Coumadin), clopidogrel (Plavix), or aspirin, be sure to talk to your doctor. He or she will tell you if and when to start taking those medicines again. Make sure that you understand exactly what your doctor wants you to do. · Your doctor may advise you to take aspirin when you go home. This helps prevent blood clots. Take your medicines exactly as prescribed. Call your doctor if you think you are having a problem with your medicine. · Be safe with medicines. Take pain medicines exactly as directed. ¨ If the doctor gave you a prescription medicine for pain, take it as prescribed. ¨ If you are not taking a prescription pain medicine, ask your doctor if you can take an over-the-counter medicine. ¨ Do not take two or more pain medicines at the same time unless the doctor told you to. Many pain medicines have acetaminophen, which is Tylenol. Too much acetaminophen (Tylenol) can be harmful. · If you think your pain medicine is making you sick to your stomach: 
¨ Take your medicine after meals (unless your doctor has told you not to). ¨ Ask your doctor for a different pain medicine. · If your doctor prescribed antibiotics, take them as directed. Do not stop taking them just because you feel better. You need to take the full course of antibiotics. · If you take a blood thinner, such as aspirin, be sure you get instructions about how to take your medicine safely. Blood thinners can cause serious bleeding problems. Incision care · If you have strips of tape over your incision, leave the tape on for a week or until it falls off. · Wash the area daily with water and pat it dry. Other cleaning products, such as hydrogen peroxide, can make the wound heal more slowly. You may cover the area with a gauze bandage if it weeps or rubs against clothing. Change the bandage every day. · Keep the area clean and dry. Follow-up care is a key part of your treatment and safety. Be sure to make and go to all appointments, and call your doctor if you are having problems. It's also a good idea to know your test results and keep a list of the medicines you take. When should you call for help? Call 911 anytime you think you may need emergency care. For example, call if: 
· You passed out (lost consciousness). · You have severe trouble breathing. · You have a tight bulge in your neck on the side where the surgery was done. · You have symptoms of a stroke. These may include: 
¨ Sudden numbness, tingling, weakness, or loss of movement in your face, arm, or leg, especially on only one side of your body. ¨ Sudden vision changes. ¨ Sudden trouble speaking. ¨ Sudden confusion or trouble understanding simple statements. ¨ Sudden problems with walking or balance. ¨ A sudden, severe headache that is different from past headaches. · You have chest pain or pressure. This may occur with: ¨ Sweating. ¨ Shortness of breath. ¨ Nausea or vomiting. ¨ Pain that spreads from the chest to the neck, jaw, or one or both shoulders or arms. ¨ Dizziness or lightheadedness. ¨ A fast or uneven pulse. After calling 911, chew 1 adult-strength or 2 to 4 low-dose aspirin. Wait for an ambulance. Do not try to drive yourself. Call your doctor now or seek immediate medical care if: 
· You are sick to your stomach or cannot keep fluids down. · You have pain that does not get better after you take pain medicine. · You have a fever over 100°F. 
· You have loose stitches, or your incision comes open. · Bright red blood has soaked through the bandage over your incision. · You have signs of infection, such as: 
¨ Increased pain, swelling, warmth, or redness. ¨ Red streaks leading from the incisions. ¨ Pus draining from the incisions. ¨ Swollen lymph nodes in your neck, armpits, or groin. ¨ A fever. Watch closely for any changes in your health, and be sure to contact your doctor if you have any problems. Where can you learn more? Go to http://cindy-bella.info/. Enter A178 in the search box to learn more about \"Carotid Endarterectomy: What to Expect at Home. \" Current as of: April 3, 2017 Content Version: 11.3 © 7517-7563 [employer]. Care instructions adapted under license by [employer] (which disclaims liability or warranty for this information). If you have questions about a medical condition or this instruction, always ask your healthcare professional. [address] any warranty or liability for your use of this information. Stopping Smoking: Care Instructions Your Care Instructions Cigarette smokers crave the nicotine in cigarettes. Giving it up is much harder than simply changing a habit. Your body has to stop craving the nicotine. It is hard to quit, but you can do it. There are many tools that people use to quit smoking. You may find that combining tools works best for you. There are several steps to quitting. First you get ready to quit. Then you get support to help you. After that, you learn new skills and behaviors to become a nonsmoker. For many people, a necessary step is getting and using medicine. Your doctor will help you set up the plan that best meets your needs. You may want to attend a smoking cessation program to help you quit smoking. When you choose a program, look for one that has proven success. Ask your doctor for ideas. You will greatly increase your chances of success if you take medicine as well as get counseling or join a cessation program. 
Some of the changes you feel when you first quit tobacco are uncomfortable. Your body will miss the nicotine at first, and you may feel short-tempered and grumpy. You may have trouble sleeping or concentrating. Medicine can help you deal with these symptoms. You may struggle with changing your smoking habits and rituals. The last step is the tricky one: Be prepared for the smoking urge to continue for a time. This is a lot to deal with, but keep at it. You will feel better. Follow-up care is a key part of your treatment and safety.  Be sure to make and go to all appointments, and call your doctor if you are having problems. Its also a good idea to know your test results and keep a list of the medicines you take. How can you care for yourself at home? · Ask your family, friends, and coworkers for support. You have a better chance of quitting if you have help and support. · Join a support group, such as Nicotine Anonymous, for people who are trying to quit smoking. · Consider signing up for a smoking cessation program, such as the American Lung Association's Freedom from Smoking program. 
· Set a quit date. Pick your date carefully so that it is not right in the middle of a big deadline or stressful time. Once you quit, do not even take a puff. Get rid of all ashtrays and lighters after your last cigarette. Clean your house and your clothes so that they do not smell of smoke. · Learn how to be a nonsmoker. Think about ways you can avoid those things that make you reach for a cigarette. ¨ Avoid situations that put you at greatest risk for smoking. For some people, it is hard to have a drink with friends without smoking. For others, they might skip a coffee break with coworkers who smoke. ¨ Change your daily routine. Take a different route to work or eat a meal in a different place. · Cut down on stress. Calm yourself or release tension by doing an activity you enjoy, such as reading a book, taking a hot bath, or gardening. · Talk to your doctor or pharmacist about nicotine replacement therapy, which replaces the nicotine in your body. You still get nicotine but you do not use tobacco. Nicotine replacement products help you slowly reduce the amount of nicotine you need. These products come in several forms, many of them available over-the-counter: ¨ Nicotine patches ¨ Nicotine gum and lozenges ¨ Nicotine inhaler · Ask your doctor about bupropion (Wellbutrin) or varenicline (Chantix), which are prescription medicines. They do not contain nicotine. They help you by reducing withdrawal symptoms, such as stress and anxiety. · Some people find hypnosis, acupuncture, and massage helpful for ending the smoking habit. · Eat a healthy diet and get regular exercise. Having healthy habits will help your body move past its craving for nicotine. · Be prepared to keep trying. Most people are not successful the first few times they try to quit. Do not get mad at yourself if you smoke again. Make a list of things you learned and think about when you want to try again, such as next week, next month, or next year. Where can you learn more? Go to http://cindyUpfront Chromatographybella.info/. Enter W611 in the search box to learn more about \"Stopping Smoking: Care Instructions. \" Current as of: March 20, 2017 Content Version: 11.3 © 6646-1732 [employer]. Care instructions adapted under license by [employer] (which disclaims liability or warranty for this information). If you have questions about a medical condition or this instruction, always ask your healthcare professional. [address] any warranty or liability for your use of this information. Learning About Benefits From Quitting Smoking How does quitting smoking make you healthier? If you're thinking about quitting smoking, you may have a few reasons to be smoke-free. Your health may be one of them. · When you quit smoking, you lower your risks for cancer, lung disease, heart attack, stroke, blood vessel disease, and blindness from macular degeneration. · When you're smoke-free, you get sick less often, and you heal faster. You are less likely to get colds, flu, bronchitis, and pneumonia. · As a nonsmoker, you may find that your mood is better and you are less stressed. When and how will you feel healthier?  
Quitting has real health benefits that start from day 1 of being smoke-free. And the longer you stay smoke-free, the healthier you get and the better you feel. The first hours · After just 20 minutes, your blood pressure and heart rate go down. That means there's less stress on your heart and blood vessels. · Within 12 hours, the level of carbon monoxide in your blood drops back to normal. That makes room for more oxygen. With more oxygen in your body, you may notice that you have more energy than when you smoked. After 2 weeks · Your lungs start to work better. · Your risk of heart attack starts to drop. After 1 month · When your lungs are clear, you cough less and breathe deeper, so it's easier to be active. · Your sense of taste and smell return. That means you can enjoy food more than you have since you started smoking. Over the years · After 1 year, your risk of heart disease is half what it would be if you kept smoking. · After 5 years, your risk of stroke starts to shrink. Within a few years after that, it's about the same as if you'd never smoked. · After 10 years, your risk of dying from lung cancer is cut by about half. And your risk for many other types of cancer is lower too. How would quitting help others in your life? When you quit smoking, you improve the health of everyone who now breathes in your smoke. · Their heart, lung, and cancer risks drop, much like yours. · They are sick less. For babies and small children, living smoke-free means they're less likely to have ear infections, pneumonia, and bronchitis. · If you're a woman who is or will be pregnant someday, quitting smoking means a healthier . · Children who are close to you are less likely to become adult smokers. Where can you learn more? Go to http://cindy-bella.info/. Enter 052 [id number] [id number] 11 in the search box to learn more about \"Learning About Benefits From Quitting Smoking. \" Current as of: 2017 Content Version: 11.3 © 5515-6174 [employer]. Care instructions adapted under license by [employer] (which disclaims liability or warranty for this information). If you have questions about a medical condition or this instruction, always ask your healthcare professional. [address] any warranty or liability for your use of this information. DISCHARGE SUMMARY from Nurse The following personal items are in your possession at time of discharge: 
 
Dental Appliances: None Visual Aid: None Home Medications: None Jewelry: None Clothing: None, Other (comment) (sent home with family) Other Valuables: None Personal Items Sent to Safe: none PATIENT INSTRUCTIONS: 
 
 
F-face looks uneven A-arms unable to move or move unevenly S-speech slurred or non-existent T-time-call 911 as soon as signs and symptoms begin-DO NOT go Back to bed or wait to see if you get better-TIME IS BRAIN. Warning Signs of HEART ATTACK Call 911 if you have these symptoms: 
? Chest discomfort. Most heart attacks involve discomfort in the center of the chest that lasts more than a few minutes, or that goes away and comes back. It can feel like uncomfortable pressure, squeezing, fullness, or pain. ? Discomfort in other areas of the upper body. Symptoms can include pain or discomfort in one or both arms, the back, neck, jaw, or stomach. ? Shortness of breath with or without chest discomfort. ? Other signs may include breaking out in a cold sweat, nausea, or lightheadedness. Don't wait more than five minutes to call [address]! Fast action can save your life.  Calling 911 is almost always the fastest way to get lifesaving treatment. Emergency Medical Services staff can begin treatment when they arrive  up to an hour sooner than if someone gets to the hospital by car. The discharge information has been reviewed with the patient. The patient verbalized understanding. Discharge medications reviewed with the patient and appropriate educational materials and side effects teaching were provided. Discharge Orders None [employer] Announcement We are excited to announce that we are making your provider's discharge notes available to you in [employer]. You will see these notes when they are completed and signed by the physician that discharged you from your recent hospital stay. If you have any questions or concerns about any information you see in [employer], please call the Health Information Department where you were seen or reach out to your Primary Care Provider for more information about your plan of care. Introducing [facility]! Obdulia Hernandez introduces [employer] patient portal. Now you can access parts of your medical record, email your doctor's office, and request medication refills online. 1. In your internet browser, go to [email or web address] 2. Click on the First Time User? Click Here link in the Sign In box. You will see the New Member Sign Up page. 3. Enter your [employer] Access Code exactly as it appears below. You will not need to use this code after youve completed the sign-up process. If you do not sign up before the expiration date, you must request a new code. · [employer] Access Code: [email or web address] Expires: 1/3/2018  9:20 AM 
 
4. Enter the last four digits of your Social Security Number (xxxx) and Date of Birth (mm/dd/yyyy) as indicated and click Submit. You will be taken to the next sign-up page. 5. Create a [employer] ID. This will be your [employer] login ID and cannot be changed, so think of one that is secure and easy to remember. 6. Create a [employer] password. You can change your password at any time. 7. Enter your Password Reset Question and Answer. This can be used at a later time if you forget your password. 8. Enter your e-mail address. You will receive e-mail notification when new information is available in [address] 9. Click Sign Up. You can now view and download portions of your medical record. 10. Click the Download Summary menu link to download a portable copy of your medical information. If you have questions, please visit the Frequently Asked Questions section of the [employer] website. Remember, [employer] is NOT to be used for urgent needs. For medical emergencies, dial 911. Now available from your iPhone and Android! General Information Please provide this summary of care documentation to your next provider. Patient Signature:  ____________________________________________________________ Date:  ____________________________________________________________  
  
[facility] Provider Signature:  ____________________________________________________________ Date:  ____________________________________________________________

## 2017-10-18 PROCEDURE — 74011250637 HC RX REV CODE- 250/637: Performed by: SURGERY

## 2017-10-18 PROCEDURE — 97161 PT EVAL LOW COMPLEX 20 MIN: CPT

## 2017-10-18 PROCEDURE — 74011250636 HC RX REV CODE- 250/636: Performed by: SURGERY

## 2017-10-18 PROCEDURE — 65270000029 HC RM PRIVATE

## 2017-10-18 RX ORDER — ALBUTEROL SULFATE 90 UG/1
1 AEROSOL, METERED RESPIRATORY (INHALATION)
Status: DISCONTINUED | OUTPATIENT
Start: 2017-10-18 | End: 2017-10-19 | Stop reason: HOSPADM

## 2017-10-18 RX ORDER — ATORVASTATIN CALCIUM 40 MG/1
40 TABLET, FILM COATED ORAL
Status: DISCONTINUED | OUTPATIENT
Start: 2017-10-18 | End: 2017-10-19 | Stop reason: HOSPADM

## 2017-10-18 RX ORDER — GLIMEPIRIDE 2 MG/1
2 TABLET ORAL
Status: DISCONTINUED | OUTPATIENT
Start: 2017-10-18 | End: 2017-10-19 | Stop reason: HOSPADM

## 2017-10-18 RX ORDER — GABAPENTIN 300 MG/1
300 CAPSULE ORAL AS NEEDED
Status: DISCONTINUED | OUTPATIENT
Start: 2017-10-18 | End: 2017-10-19 | Stop reason: HOSPADM

## 2017-10-18 RX ORDER — MAG HYDROX/ALUMINUM HYD/SIMETH 200-200-20
30 SUSPENSION, ORAL (FINAL DOSE FORM) ORAL
Status: DISCONTINUED | OUTPATIENT
Start: 2017-10-18 | End: 2017-10-19 | Stop reason: HOSPADM

## 2017-10-18 RX ORDER — METFORMIN HYDROCHLORIDE 500 MG/1
1000 TABLET ORAL 2 TIMES DAILY WITH MEALS
Status: DISCONTINUED | OUTPATIENT
Start: 2017-10-18 | End: 2017-10-19 | Stop reason: HOSPADM

## 2017-10-18 RX ORDER — IBUPROFEN 200 MG
1 TABLET ORAL DAILY
Status: DISCONTINUED | OUTPATIENT
Start: 2017-10-18 | End: 2017-10-19 | Stop reason: HOSPADM

## 2017-10-18 RX ADMIN — OXYCODONE HYDROCHLORIDE AND ACETAMINOPHEN 1 TABLET: 5; 325 TABLET ORAL at 21:28

## 2017-10-18 RX ADMIN — ALUMINUM HYDROXIDE, MAGNESIUM HYDROXIDE, AND SIMETHICONE 30 ML: 200; 200; 20 SUSPENSION ORAL at 21:28

## 2017-10-18 RX ADMIN — OXYCODONE HYDROCHLORIDE AND ACETAMINOPHEN 1 TABLET: 5; 325 TABLET ORAL at 09:07

## 2017-10-18 RX ADMIN — AMLODIPINE BESYLATE 10 MG: 10 TABLET ORAL at 09:05

## 2017-10-18 RX ADMIN — CARVEDILOL 25 MG: 25 TABLET, FILM COATED ORAL at 16:21

## 2017-10-18 RX ADMIN — OXYCODONE HYDROCHLORIDE AND ACETAMINOPHEN 1 TABLET: 5; 325 TABLET ORAL at 16:21

## 2017-10-18 RX ADMIN — CEFAZOLIN 2 G: 1 INJECTION, POWDER, FOR SOLUTION INTRAMUSCULAR; INTRAVENOUS; PARENTERAL at 03:24

## 2017-10-18 RX ADMIN — LISINOPRIL 5 MG: 5 TABLET ORAL at 09:05

## 2017-10-18 RX ADMIN — ATORVASTATIN CALCIUM 40 MG: 40 TABLET, FILM COATED ORAL at 21:28

## 2017-10-18 RX ADMIN — OXYCODONE HYDROCHLORIDE AND ACETAMINOPHEN 1 TABLET: 5; 325 TABLET ORAL at 03:24

## 2017-10-18 RX ADMIN — Medication 5 ML: at 21:33

## 2017-10-18 RX ADMIN — GLIMEPIRIDE 2 MG: 2 TABLET ORAL at 10:54

## 2017-10-18 RX ADMIN — METFORMIN HYDROCHLORIDE 1000 MG: 500 TABLET, FILM COATED ORAL at 10:54

## 2017-10-18 RX ADMIN — ASPIRIN 81 MG: 81 TABLET, COATED ORAL at 09:05

## 2017-10-18 RX ADMIN — ALUMINUM HYDROXIDE, MAGNESIUM HYDROXIDE, AND SIMETHICONE 30 ML: 200; 200; 20 SUSPENSION ORAL at 17:38

## 2017-10-18 RX ADMIN — CARVEDILOL 25 MG: 25 TABLET, FILM COATED ORAL at 09:05

## 2017-10-18 RX ADMIN — Medication 5 ML: at 16:22

## 2017-10-18 RX ADMIN — METFORMIN HYDROCHLORIDE 1000 MG: 500 TABLET, FILM COATED ORAL at 16:21

## 2017-10-18 RX ADMIN — Medication 10 ML: at 06:09

## 2017-10-18 NOTE — PROGRESS NOTES
No complaints  BP well controlled  No new neurologic sx's    Plan trx - floor   Likely home tomorrow

## 2017-10-18 NOTE — PROGRESS NOTES
Problem: Interdisciplinary Rounds  Goal: Interdisciplinary Rounds  Outcome: Progressing Towards Goal  Interdisciplinary team rounds were held 10/18/2017 with the following team members:Care Management, Speech Therapy and . Plan of care discussed. See clinical pathway and/or care plan for interventions and desired outcomes.

## 2017-10-18 NOTE — PROGRESS NOTES
Problem: Falls - Risk of  Goal: *Absence of Falls  Document Antwan Fall Risk and appropriate interventions in the flowsheet.    Outcome: Progressing Towards Goal  Fall Risk Interventions:  Mobility Interventions: Bed/chair exit alarm, Communicate number of staff needed for ambulation/transfer, OT consult for ADLs, Patient to call before getting OOB, PT Consult for mobility concerns, PT Consult for assist device competence, Utilize walker, cane, or other assitive device         Medication Interventions: Bed/chair exit alarm, Patient to call before getting OOB, Evaluate medications/consider consulting pharmacy, Teach patient to arise slowly    Elimination Interventions: Bed/chair exit alarm, Call light in reach, Patient to call for help with toileting needs, Toileting schedule/hourly rounds, Urinal in reach    History of Falls Interventions: Bed/chair exit alarm, Consult care management for discharge planning, Door open when patient unattended, Evaluate medications/consider consulting pharmacy, Investigate reason for fall

## 2017-10-18 NOTE — PROGRESS NOTES
Spoke with [facility] at [facility]. ([phone]   #[phone])  Patient is currently enrolled in a home telephonic program.  The process includes phone calls on a regular basis to assist them with MD appointments and any follow up assistance. He does not receive any MOW's at this time. They have a service they provide after a hospital stay called well dine which provides 10 frozen meals which he has had in the past and would be eligible for again upon discharge. According to David Rodriguez once patient is d/c from hospital she will be notified and she will make a follow up call to him. If he accepts he will be eligible for a Long Term In Home Service which would provide an in home nurse instead of telephonic assistance. Patient notified of above information and verbalized understanding.

## 2017-10-18 NOTE — PROGRESS NOTES
TRANSFER - IN REPORT:    Verbal report received from [address] Monson RN(name) on [employer]  being received from CVICU(unit) for routine post - op      Report consisted of patients Situation, Background, Assessment and   Recommendations(SBAR). Information from the following report(s) SBAR, Kardex, Intake/Output and MAR was reviewed with the receiving nurse. Opportunity for questions and clarification was provided. Assessment completed upon patients arrival to unit and care assumed.

## 2017-10-18 NOTE — PROGRESS NOTES
Bedside shift change report given to Ingrid Pantoja RN (oncoming nurse) by Jomar Polk RN (offgoing nurse). Report included the following information SBAR, Kardex, OR Summary, Procedure Summary, Intake/Output, MAR, Recent Results, Med Rec Status and Cardiac Rhythm of Sinus Bradycardia/NSR.

## 2017-10-18 NOTE — PROGRESS NOTES
TRANSFER - OUT REPORT:    Verbal report given to Radha Barros RN(name) on [employer]  being transferred to 7th floor(unit) for routine progression of care       Report consisted of patients Situation, Background, Assessment and   Recommendations(SBAR). Information from the following report(s) Kardex, OR Summary, recent results, Cardiac Rhythm NSR was reviewed with the receiving nurse. Lines:   Peripheral IV 10/17/17 Right Hand (Active)   Site Assessment Clean, dry, & intact 10/18/2017  7:20 AM   Phlebitis Assessment 0 10/18/2017  7:20 AM   Infiltration Assessment 0 10/18/2017  7:20 AM   Dressing Status Clean, dry, & intact 10/18/2017  7:20 AM   Dressing Type Transparent 10/18/2017  7:20 AM   Hub Color/Line Status Green;Capped 10/18/2017  7:20 AM   Action Taken Open ports on tubing capped 10/18/2017  7:20 AM       Peripheral IV 10/17/17 Left Forearm (Active)   Site Assessment Clean, dry, & intact 10/18/2017  7:20 AM   Phlebitis Assessment 0 10/18/2017  7:20 AM   Infiltration Assessment 0 10/18/2017  7:20 AM   Dressing Status Clean, dry, & intact 10/18/2017  7:20 AM   Dressing Type Transparent 10/18/2017  7:20 AM   Hub Color/Line Status Green;Capped 10/18/2017  7:20 AM   Action Taken Open ports on tubing capped 10/18/2017  7:20 AM        Opportunity for questions and clarification was provided.       Patient transported with:   [employer]

## 2017-10-18 NOTE — PROGRESS NOTES
Spiritual Care Assessment/Progress Notes    Jesse Ambrosoi [id number]  [id number]    1955  60 y.o.  male    Patient Telephone Number: There is no home phone number on file. Confucianist Affiliation: No preference   Language: English   Extended Emergency Contact Information  Primary Emergency Contact: [address] Phone: [phone]  Relation: Sister   Patient Active Problem List    Diagnosis Date Noted    Status post carotid endarterectomy 10/17/2017    Carotid stenosis 10/05/2017    [facility] 10/05/2017    HTN (hypertension) 10/05/2017    Stroke ([facility] 10/05/2017    Left-sided weakness 10/05/2017        Date: 10/18/2017       Level of Confucianist/Spiritual Activity:  []         Involved in michelle tradition/spiritual practice    []         Not involved in michelle tradition/spiritual practice  [x]         Spiritually oriented    []         Claims no spiritual orientation    []         seeking spiritual identity  []         Feels alienated from Religion practice/tradition  []         Feels angry about Religion practice/tradition  [x]         Spirituality/Religion tradition is a resource for coping at this time.   []         Not able to assess due to medical condition    Services Provided Today:  []         crisis intervention    []         reading Scriptures  []         spiritual assessment    [x]         prayer  []         empathic listening/emotional support  []         rites and rituals (cite in comments)  []         life review     []         Religion support  []         theological development   []         advocacy  []         ethical dialog     []         blessing  []         bereavement support    []         support to family  []         anticipatory grief support   []         help with AMD  []         spiritual guidance    []         meditation      Spiritual Care Needs  []         Emotional Support  [x]         Spiritual/Confucianist Care  [] Loss/Adjustment  []         Advocacy/Referral                /Ethics  []         No needs expressed at               this time  []         Other: (note in               comments)  Spiritual Care Plan  []         Follow up visits with               pt/family  []         Provide materials  []         Schedule sacraments  []         Contact Community               Clergy  [x]         Follow up as needed  []         Other: (note in               comments)     Comments: Patient looking for place to live; asked for prayer. Prayer and support provided.     Anna Vogel

## 2017-10-18 NOTE — PROGRESS NOTES
Spoke with patient regarding discharge planning. Alert and oriented x 4  Lives alone in a [address] on his cousins property. States his cousin Cleve Gates use to provide his meals and keep the camper clean. He uses a garbage can as a toilet and has no running water. Depends on himself for his care however has a sister April Magana and cousins that live nearby. Due to his previous strokes he has residual left sided weakness. Patient states up until approx 2-3 months ago he was managing well on his own but developed increased BLE weakness, left leg numbness, and left arm pain. States he has fallen at least 3x in the last few months. Has a car of his own for transportation. PCP- Dr. Tyler Main and his next appointment is 11-16-17  DME- 4 prong cane  Denies any problems filling scripts. Community support-MOW's once per month? States it was set up by [facility] [employer]. Spoke with Hanny Martinez at [facility] regarding his program and clarification of services. She will contact his program liasion to clarify and will have her contact me. Pt states he receives $1200/mos in disability. His car is paid off and he only has two more payments on the camper. He is hoping to sell the camper and get into a one BR apartment soon. Pending PT evaluation   CM will continue to follow for any further needs. Care Management Interventions  PCP Verified by CM: Yes (next appt scheduled for 11-16-17)  Mode of Transport at Discharge: Other (see comment) (family)  Transition of Care Consult (CM Consult): Discharge Planning  Physical Therapy Consult: Yes  Current Support Network: Lives Alone, Family Lives Nearby  Confirm Follow Up Transport: Self  Plan discussed with Pt/Family/Caregiver: Yes  Freedom of Choice Offered:  Yes

## 2017-10-18 NOTE — PROGRESS NOTES
Problem: Mobility Impaired (Adult and Pediatric)  Goal: *Acute Goals and Plan of Care (Insert Text)  STG:  (1.)Mr. Remberto Chao will move from supine to sit and sit to supine  and roll side to side with CONTACT GUARD ASSIST within 3 day(s). (2.)Mr. Remberto Chao will transfer from bed to chair and chair to bed with CONTACT GUARD ASSIST using the least restrictive device within 3 day(s). (3.)Mr. Remberto Chao will ambulate with CONTACT GUARD ASSIST for 50 feet with the least restrictive device within 3 day(s). LTG:  (1.)Mr. Remberto Chao will move from supine to sit and sit to supine , scoot up and down and roll side to side in bed with SUPERVISION within 7 day(s). (2.)Mr. Remberto Chao will transfer from bed to chair and chair to bed with SUPERVISION using the least restrictive device within 7 day(s). (3.)Mr. Remberto Chao will ambulate with SUPERVISION for 150 feet with the least restrictive device within 7 day(s). ________________________________________________________________________________________________    PHYSICAL THERAPY: Initial Assessment, AM 10/18/2017  INPATIENT: Hospital Day: 2  Payor: Owen Black / Plan: [address] [place] [address] HMO / Product Type: [employer] Medicare /      NAME/AGE/GENDER: Becca William is a 60 y.o. male   PRIMARY DIAGNOSIS: Carotid stenosis, right [I65.21] Carotid stenosis Carotid stenosis  Procedure(s) (LRB):  LEFT CAROTID ENDARTERECTOMY (Left)  1 Day Post-Op  ICD-10: Treatment Diagnosis:   · Generalized Muscle Weakness (M62.81)  · Other lack of cordination (R27.8)   Precaution/Allergies:  Pravastatin      ASSESSMENT:     Mr. Remberto Chao is status post above and presents with decreased activity tolerance, impaired functional mobility, limited safety awareness and weakness on L side due to old stroke. Patient was sitting in recliner chair on arrival and agreeable to PT evaluation. Patient has good strength on R side and weaker on L side.   Patient commented he went home after his stroke instead of rehab placement, but was receiving [facility] PT. Patient commented using a cane for mobility at home. Patient also reported he does not have running water to his camper, but he does still have power. Patient was able to transfer to standing with minimal assist and ambulate with hand held assist for 25 feet. Patient had a slight hemiplegic gait and would benefit from use of assistive device. Patient ambulated to wheelchair to be taken to 7th floor room. Patient would benefit from continued skilled acute PT and further PT at discharge as patient lives alone and is currently functioning below baseline. This section established at most recent assessment   PROBLEM LIST (Impairments causing functional limitations):  1. Decreased Strength  2. Decreased ADL/Functional Activities  3. Decreased Transfer Abilities  4. Decreased Ambulation Ability/Technique  5. Decreased Balance  6. Decreased Activity Tolerance  7. Decreased Knowledge of Precautions  8. Decreased Marathon with Home Exercise Program   INTERVENTIONS PLANNED: (Benefits and precautions of physical therapy have been discussed with the patient.)  1. Balance Exercise  2. Bed Mobility  3. Family Education  4. Gait Training  5. Home Exercise Program (HEP)  6. Therapeutic Activites  7. Therapeutic Exercise/Strengthening  8. Transfer Training  9. Group Therapy     TREATMENT PLAN: Frequency/Duration: daily for duration of hospital stay  Rehabilitation Potential For Stated Goals: Excellent     RECOMMENDED REHABILITATION/EQUIPMENT: (at time of discharge pending progress): Due to the probability of continued deficits (see above) this patient will likely need continued skilled physical therapy after discharge. Equipment:    to be determined              HISTORY:   History of Present Injury/Illness (Reason for Referral):   pt is status post above  Past Medical History/Comorbidities:   Mr. Julien Wasserman  has a past medical history of Allergic rhinitis;  Anxiety; Carotid stenosis; Chronic obstructive pulmonary disease ([facility] Chronic pain; Depression; ED (erectile dysfunction); H/O cardiac murmur; Hip bursitis; Hypercholesterolemia; Hypertension; Osteoarthritis; Stroke [facility]) ([id number], 2017); Tobacco abuse; and Type 2 diabetes mellitus ([facility] Mr. Tra Acevedo  has a past surgical history that includes hernia repair (Left) and carotid endarterectomy (Right, 2014). Social History/Living Environment:   Home Environment: Other (comment) (32 foot camper)  # Steps to Enter: 1  One/Two Story Residence: One story  Living Alone: Yes  Support Systems: Other (comments) (Poor support system in place)  Patient Expects to be Discharged to[de-identified] Assisted living (Patient hopes to be able to transition to assisted living)  Current DME Used/Available at Home: Cane, quad (has a cane but will not use it)  Prior Level of Function/Work/Activity:  Patient lives in a camper with no running water. Patient uses a cane for mobility in home and needs assistance with grocery shopping. Patient has fallen multiple times recenlty  Previous Treatment Approaches:          HH PT; however, recent decline in balance   Number of Personal Factors/Comorbidities that affect the Plan of Care: 3+: HIGH COMPLEXITY   EXAMINATION:   Most Recent Physical Functioning:   Gross Assessment:  AROM: Generally decreased, functional  Strength: Generally decreased, functional (L LE grossly 3+/5, L UE 3/5, weakness from old CVA)  Coordination: Generally decreased, functional  Sensation: Impaired (pt reports mild numbness L LE)               Posture:  Posture (WDL): Exceptions to WDL  Posture Assessment: Forward head  Balance:  Sitting: Impaired  Sitting - Static: Good (unsupported)  Sitting - Dynamic: Fair (occasional)  Standing: Impaired  Standing - Static: Constant support; Fair  Standing - Dynamic : Fair Bed Mobility:  Rolling:  (not tested, pt up in chair on arrival to room)  Wheelchair Mobility:     Transfers:  Sit to Stand: Minimum assistance  Stand to Sit: Contact guard assistance (safety concerns)  Gait:     Base of Support: Widened  Speed/Erica: Slow  Step Length: Right shortened  Gait Abnormalities: Altered arm swing;Decreased step clearance; Path deviations (slightly hemiplegic on L side)  Distance (ft): 25 Feet (ft)  Assistive Device:  (hand held assist, try yobani-walker next session)  Ambulation - Level of Assistance: Minimal assistance  Interventions: Verbal cues; Tactile cues; Safety awareness training;Manual cues      Body Structures Involved:  1. Muscles Body Functions Affected:  1. Neuromusculoskeletal Activities and Participation Affected:  1. General Tasks and Demands  2. Mobility  3. Self Care  4. Domestic Life   Number of elements that affect the Plan of Care: 4+: HIGH COMPLEXITY   CLINICAL PRESENTATION:   Presentation: Stable and uncomplicated: LOW COMPLEXITY   CLINICAL DECISION MAKIN[address] [place] [id number] AM-PAC 6 Clicks   Basic Mobility Inpatient Short Form  How much difficulty does the patient currently have. .. Unable A Lot A Little None   1. Turning over in bed (including adjusting bedclothes, sheets and blankets)? [] 1   [] 2   [x] 3   [] 4   2. Sitting down on and standing up from a chair with arms ( e.g., wheelchair, bedside commode, etc.)   [] 1   [] 2   [x] 3   [] 4   3. Moving from lying on back to sitting on the side of the bed? [] 1   [] 2   [x] 3   [] 4   How much help from another person does the patient currently need. .. Total A Lot A Little None   4. Moving to and from a bed to a chair (including a wheelchair)? [] 1   [] 2   [x] 3   [] 4   5. Need to walk in hospital room? [] 1   [] 2   [x] 3   [] 4   6. Climbing 3-5 steps with a railing? [] 1   [x] 2   [] 3   [] 4   © , Trustees of [address] [place] [id number], under license to [employer].  All rights reserved      Score:  Initial: 17 Most Recent: X (Date: -- )    Interpretation of Tool:  Represents activities that are increasingly more difficult (i.e. Bed mobility, Transfers, Gait). Score 24 23 22-20 19-15 14-10 9-7 6     Modifier CH CI CJ CK CL CM CN      ? Mobility - Walking and Moving Around:     - CURRENT STATUS: CK - 40%-59% impaired, limited or restricted    - GOAL STATUS: CJ - 20%-39% impaired, limited or restricted    - D/C STATUS:  ---------------To be determined---------------  Payor: Dottie Quivers / Plan: [address] HMO / Product Type: Managed Care Medicare /      Medical Necessity:     · Patient demonstrates good rehab potential due to higher previous functional level. · Skilled intervention continues to be required due to decline in overall functional mobility. Reason for Services/Other Comments:  · Patient continues to require present interventions due to patient's inability to perform functional mobility at previous indepencence level. Use of outcome tool(s) and clinical judgement create a POC that gives a: Clear prediction of patient's progress: LOW COMPLEXITY            TREATMENT:   (In addition to Assessment/Re-Assessment sessions the following treatments were rendered)   Pre-treatment Symptoms/Complaints: \"Been falling a lot\"  Pain: Initial:   Pain Intensity 1: 2  Pain Intervention(s) 1: Ambulation/Increased Activity, Nurse notified  Post Session:  None noted     Assessment/Reassessment only, no treatment provided today    Braces/Orthotics/Lines/Etc:   · O2 Device: Room air  Treatment/Session Assessment:    · Response to Treatment:  Tolerated well, safety concerns with balance  · Interdisciplinary Collaboration:   o Physical Therapist  o Registered Nurse  · After treatment position/precautions:   o Up in chair  o Call light within reach  o RN notified  o being transferred to 7th floor   · Compliance with Program/Exercises: compliant all of the time. · Recommendations/Intent for next treatment session:   \"Next visit will focus on advancements to more challenging activities and reduction in assistance provided\".   Total Treatment Duration:  PT Patient Time In/Time Out  Time In: 0928  Time Out: [address] [facility] Pioneer Amador DPT

## 2017-10-19 ENCOUNTER — HOME HEALTH ADMISSION (OUTPATIENT)
Dept: HOME HEALTH SERVICES | Facility: HOME HEALTH | Age: 62
End: 2017-10-19
Payer: MEDICARE

## 2017-10-19 VITALS
WEIGHT: 181.2 LBS | BODY MASS INDEX: 26.84 KG/M2 | SYSTOLIC BLOOD PRESSURE: 138 MMHG | TEMPERATURE: 98.9 F | RESPIRATION RATE: 20 BRPM | DIASTOLIC BLOOD PRESSURE: 85 MMHG | OXYGEN SATURATION: 96 % | HEIGHT: 69 IN | HEART RATE: 88 BPM

## 2017-10-19 LAB
EST. AVERAGE GLUCOSE BLD GHB EST-MCNC: 123 MG/DL
HBA1C MFR BLD: 5.9 % (ref 4.8–6)

## 2017-10-19 PROCEDURE — 90471 IMMUNIZATION ADMIN: CPT

## 2017-10-19 PROCEDURE — 74011250636 HC RX REV CODE- 250/636: Performed by: SURGERY

## 2017-10-19 PROCEDURE — 83036 HEMOGLOBIN GLYCOSYLATED A1C: CPT | Performed by: SURGERY

## 2017-10-19 PROCEDURE — 36415 COLL VENOUS BLD VENIPUNCTURE: CPT | Performed by: SURGERY

## 2017-10-19 PROCEDURE — 97530 THERAPEUTIC ACTIVITIES: CPT

## 2017-10-19 PROCEDURE — 74011250637 HC RX REV CODE- 250/637: Performed by: SURGERY

## 2017-10-19 PROCEDURE — 90686 IIV4 VACC NO PRSV 0.5 ML IM: CPT | Performed by: SURGERY

## 2017-10-19 RX ORDER — TRAMADOL HYDROCHLORIDE 50 MG/1
50 TABLET ORAL
Qty: 30 TAB | Refills: 0 | Status: SHIPPED | OUTPATIENT
Start: 2017-10-19

## 2017-10-19 RX ORDER — IBUPROFEN 200 MG
1 TABLET ORAL DAILY
Qty: 30 PATCH | Refills: 0 | Status: SHIPPED | OUTPATIENT
Start: 2017-10-20 | End: 2017-11-19

## 2017-10-19 RX ADMIN — METFORMIN HYDROCHLORIDE 1000 MG: 500 TABLET, FILM COATED ORAL at 08:49

## 2017-10-19 RX ADMIN — INFLUENZA VIRUS VACCINE 0.5 ML: 15; 15; 15; 15 SUSPENSION INTRAMUSCULAR at 11:51

## 2017-10-19 RX ADMIN — Medication 10 ML: at 05:01

## 2017-10-19 RX ADMIN — OXYCODONE HYDROCHLORIDE AND ACETAMINOPHEN 1 TABLET: 5; 325 TABLET ORAL at 05:00

## 2017-10-19 RX ADMIN — ASPIRIN 81 MG: 81 TABLET, COATED ORAL at 08:49

## 2017-10-19 RX ADMIN — AMLODIPINE BESYLATE 10 MG: 10 TABLET ORAL at 04:59

## 2017-10-19 RX ADMIN — CARVEDILOL 25 MG: 25 TABLET, FILM COATED ORAL at 08:49

## 2017-10-19 RX ADMIN — GLIMEPIRIDE 2 MG: 2 TABLET ORAL at 04:59

## 2017-10-19 RX ADMIN — OXYCODONE HYDROCHLORIDE AND ACETAMINOPHEN 1 TABLET: 5; 325 TABLET ORAL at 08:49

## 2017-10-19 RX ADMIN — LISINOPRIL 5 MG: 5 TABLET ORAL at 04:59

## 2017-10-19 RX ADMIN — OXYCODONE HYDROCHLORIDE AND ACETAMINOPHEN 1 TABLET: 5; 325 TABLET ORAL at 13:07

## 2017-10-19 NOTE — PROGRESS NOTES
has arranged home health and a walker to be delivered to home. Discharge instructions ,medication side effects sheet, follow up appointment and prescriptions reviewed and explained to the patient. Patient verbalizes understanding of instructions. A copy of discharge instructions and prescriptions  have been given to patient. Opportunity for questions provided. Patient to be discharged when family arrives.  Will call staff on their arrival

## 2017-10-19 NOTE — PROGRESS NOTES
Order, referral and face to face completed for [facility] for PT/SW. Rolling walker ordered by Anneliese Ham to be delivered to home.

## 2017-10-19 NOTE — DISCHARGE SUMMARY
[address] FAX: [phone]        Physician Discharge Summary     Patient: Dayami Jacobo MRN: [id number]  SSN: [id number]    YOB: 1955  Age: 60 y.o. Sex: male       Admit Date: 10/17/2017    Discharge Date: 10/19/2017      Admitting Physician: Shawanda Lopez MD     Discharge Physician: Roxy High NP    Admission Diagnoses: Carotid stenosis, right [I65.21]    Discharge Diagnoses:   Problem List as of 10/19/2017  Date Reviewed: 10/10/2017          Codes Class Noted - Resolved    Status post carotid endarterectomy ICD-10-CM: Z98.890  ICD-9-CM: V45.89  10/17/2017 - Present        * (Principal)Carotid stenosis ICD-10-CM: I65.29  ICD-9-CM: 433.10  10/5/2017 - Present    Overview Signed 10/5/2017 12:24 PM by Oscar Hoang NP     R CEA performed by Dr. Dorcas Gutierrez at [facility] in 2015             DM (diabetes mellitus) ([facility] [address] ICD-10-CM: E11.9  ICD-9-CM: 250.00  10/5/2017 - Present        HTN (hypertension) ICD-10-CM: I10  ICD-9-CM: 401.9  10/5/2017 - Present        Stroke ([facility] [address] ICD-10-CM: I63.9  ICD-9-CM: 434.91  10/5/2017 - Present    Overview Signed 10/5/2017 12:29 PM by Oscar Hoang NP     2008, 2011, 2017- left sided weakness             Left-sided weakness ICD-10-CM: R53.1  ICD-9-CM: 728.87  10/5/2017 - Present               Procedures for this admission: Procedure(s):  LEFT CAROTID ENDARTERECTOMY    Discharged Condition: stable    Hospital Course: Mr. Sebastián Mercer is a 60year old male who presented to the office for carotid stenosis evaluation. The patient was previously followed by Dr. Dorcas Gutierrez at [facility] and performed a R CEA by him in 2015. He currently takes 325 mg ASA but reports he hasn't been taking it lately as he has been out of the medication. He does report HTN and DM. He has had 3 strokes in the past- 2008, 2011, and 8/2017.  He does have residual left sided weakness that he reports has been present since his first stroke in 2008. He continues to smoke ~6 cigarettes per day. He denies any new lateralizing symptoms. After reviewing his imaging studies it was determined that he should undergo a Left carotid endarterectomy with bovine pericardial patch angioplasty by Dr. Gideon Jama on 10/17/17. He tolerated the surgery well. He did have some issues with living arrangements at home so he was not discharged on POD 1. Case management has gotten in touch with his [employer]  who will follow the patient as an outpatient and inpatient case management has also provided the patient with options about affordable living upon discharge. Consults: None    Significant Diagnostic Studies: labs and microbiology    Treatments: IV hydration, antibiotics: Ancef, cardiac meds: Coreg, Norvasc, Lisinopril, anticoagulation: ASA , pain medication: Morphine and Percocet and surgery: see above    Discharge Exam: GENERAL: alert, cooperative, no distress, appears stated age, LUNG:  clear to auscultation bilaterally, HEART:  regular rate and rhythm, S1, S2 normal, no murmur, click, rub or gallop, NEURO:  AOx3. Reflexes and motor strength normal and symmetric. Cranial nerves 2-12 and sensation grossly intact. and INCISION:  neck  dressing in place    Disposition: home    Current Discharge Medication List      START taking these medications    Details   traMADol (ULTRAM) 50 mg tablet Take 1 Tab by mouth every six (6) hours as needed for Pain. Max Daily Amount: 200 mg. Qty: 30 Tab, Refills: 0      nicotine (NICODERM CQ) 21 mg/24 hr 1 Patch by TransDERmal route daily for 30 days. Qty: 30 Patch, Refills: 0         CONTINUE these medications which have NOT CHANGED    Details   amLODIPine (NORVASC) 10 mg tablet Take 10 mg by mouth every morning. Associated Diagnoses: Occlusion and stenosis of carotid artery; Stenosis of left carotid artery      albuterol (PROVENTIL HFA, VENTOLIN HFA, PROAIR HFA) 90 mcg/actuation inhaler Take  by inhalation.       aspirin delayed-release 81 mg tablet Take 81 mg by mouth every morning. atorvastatin (LIPITOR) 40 mg tablet Take 40 mg by mouth nightly. carvedilol (COREG) 25 mg tablet Take 25 mg by mouth two (2) times daily (with meals). gabapentin (NEURONTIN) 300 mg capsule Take 300 mg by mouth as needed. glimepiride (AMARYL) 2 mg tablet Take  by mouth every morning. HYDROcodone-acetaminophen (NORCO) 7.5-325 mg per tablet Take 1 Tab by mouth every six (6) hours as needed. tiZANidine (ZANAFLEX) 4 mg capsule Take 4 mg by mouth as needed. lisinopril (PRINIVIL, ZESTRIL) 5 mg tablet Take 5 mg by mouth every morning. metFORMIN (GLUCOPHAGE) 500 mg tablet Take 1,000 mg by mouth two (2) times daily (with meals). Associated Diagnoses: Occlusion and stenosis of carotid artery; Stenosis of left carotid artery               Reference discharge instructions as provided by nursing for diet, labs, medications, activity, wound care and any outpatient referrals. Follow-up Appointments   Procedures    FOLLOW UP VISIT Appointment in: Other (Balaji Pichardo) Keep scheduled appt on 11/16/17     Keep scheduled appt on 11/16/17     Standing Status:   Standing     Number of Occurrences:   1     Order Specific Question:   Appointment in     Answer: Other (Specify)        Signed:  John Monet NP  10/19/2017  10:40 AM    Elements of this note have been dictated using speech recognition software. As a result, errors of speech recognition may have occurred.

## 2017-10-19 NOTE — FACE TO FACE
[address]  Face to Face Encounter    Patients Name: Sonya La    YOB: 1955    Ordering Physician: Dr. Josué Giang    Primary Diagnosis: Carotid stenosis, right [I65.21]    Date of Face to Face:   10/19/2017                                  Face to Face Encounter findings are related to primary reason for home care:   yes. 1. I certify that the patient needs intermittent care as follows: physical therapy: strengthening, transfer training, gait/stair training, balance training and pt/caregiver education    2. I certify that this patient is homebound, that is: 1) patient requires the use of a walker device, special transportation, or assistance of another to leave the home; or 2) patient's condition makes leaving the home medically contraindicated; and 3) patient has a normal inability to leave the home and leaving the home requires considerable and taxing effort. Patient may leave the home for infrequent and short duration for medical reasons, and occasional absences for non-medical reasons. Homebound status is due to the following functional limitations: Patient with visual deficits increasing risk for falls with all ambulation outside of the home. Patient requires the assistance of others when leaving the home. 3. I certify that this patient is under my care and that I, or a nurse practitioner or  [id number], or clinical nurse specialist, or certified nurse midwife, working with me, had a Face-to-Face Encounter that meets the physician Face-to-Face Encounter requirements. The following are the clinical findings from the [address] [place] [address] encounter that support the need for skilled services and is a summary of the encounter: see H&P and hospital chart.       See discharge summary      Daniel Danielle  10/19/2017      THE FOLLOWING TO BE COMPLETED BY THE COMMUNITY PHYSICIAN:    I concur with the findings described above from the [facility] encounter that this patient is homebound and in need of a skilled service.     Certifying Physician: _____________________________________      Printed Certifying Physician Name: _____________________________________    Date: _________________

## 2017-10-19 NOTE — DISCHARGE INSTRUCTIONS
Report to Dr Christian Gamino temp 100.5 or greater , redness, swelling or drainage from incision    Dressing:may remove dressing tomorrow and leave incision open to air    May shower but no tub bathing    No heavy lifting or strenuous activity for 6 weeks    No driving until directed by doctor    Notify surgeon of  pain not controlled by pain medication         Carotid Endarterectomy: What to Expect at [facility]  A carotid endarterectomy (say \"ashlyn-RAW-tid to-ldb-mzc-REPRIYA-tu-sara\") is surgery to remove fatty build-up (plaque) from one of the carotid arteries. There are two carotid arteries--one on each side of the neck--that supply blood to the brain. When plaque builds up in either artery, it can make it hard for blood to flow to the brain. This surgery may lower your risk of having a stroke. You may have a sore throat for a few days. You can expect the cut (incision) in your neck to be sore for about a week. The area around the incision may also be swollen and bruised at first. The area in front of the incision may be numb. This usually gets better after 6 to 12 months. Your doctor closed the incision in your neck with stitches. The stitches will be removed 7 to 10 days after surgery, or you may have stitches that dissolve on their own. You may feel more tired than usual for several weeks after surgery. You will probably be able to go back to work or your usual activities in 1 to 2 weeks. This care sheet gives you a general idea about how long it will take for you to recover. But each person recovers at a different pace. Follow the steps below to feel better as quickly as possible. How can you care for yourself at home? Activity  · Rest when you feel tired. Getting enough sleep will help you recover. · Try to walk each day. Start by walking a little more than you did the day before. Bit by bit, increase the amount you walk. Walking boosts blood flow and helps prevent pneumonia and constipation.   · Avoid strenuous activities, such as bicycle riding, jogging, weight lifting, or aerobic exercise. Your doctor will tell you when it's okay to do strenuous activity. · For 1 to 2 weeks, avoid lifting anything that would make you strain. This may include a child, heavy grocery bags and milk containers, a heavy briefcase or backpack, cat litter or dog food bags, or a vacuum . · Ask your doctor when you can drive again. · You will probably need to take 1 to 2 weeks off from work. It depends on the type of work you do and how you feel. · You may shower and take baths as usual. But do not soak the incision for the first 2 weeks, or until your doctor tells you it is okay. Pat the incision dry. · Your doctor will tell you when you can have sex again. Diet  · You can eat your normal diet. If your stomach is upset, try bland, low-fat foods like plain rice, broiled chicken, toast, and yogurt. · Drink plenty of fluids (unless your doctor tells you not to). · You may notice that your bowel movements are not regular right after your surgery. This is common. Try to avoid constipation and straining with bowel movements. You may want to take a fiber supplement every day. If you have not had a bowel movement after a couple of days, ask your doctor about taking a mild laxative. Medicines  · Your doctor will tell you if and when you can restart your medicines. He or she will also give you instructions about taking any new medicines. · If you take blood thinners, such as warfarin (Coumadin), clopidogrel (Plavix), or aspirin, be sure to talk to your doctor. He or she will tell you if and when to start taking those medicines again. Make sure that you understand exactly what your doctor wants you to do. · Your doctor may advise you to take aspirin when you go home. This helps prevent blood clots. Take your medicines exactly as prescribed. Call your doctor if you think you are having a problem with your medicine.   · Be safe with medicines. Take pain medicines exactly as directed. ¨ If the doctor gave you a prescription medicine for pain, take it as prescribed. ¨ If you are not taking a prescription pain medicine, ask your doctor if you can take an over-the-counter medicine. ¨ Do not take two or more pain medicines at the same time unless the doctor told you to. Many pain medicines have acetaminophen, which is Tylenol. Too much acetaminophen (Tylenol) can be harmful. · If you think your pain medicine is making you sick to your stomach:  ¨ Take your medicine after meals (unless your doctor has told you not to). ¨ Ask your doctor for a different pain medicine. · If your doctor prescribed antibiotics, take them as directed. Do not stop taking them just because you feel better. You need to take the full course of antibiotics. · If you take a blood thinner, such as aspirin, be sure you get instructions about how to take your medicine safely. Blood thinners can cause serious bleeding problems. Incision care  · If you have strips of tape over your incision, leave the tape on for a week or until it falls off. · Wash the area daily with water and pat it dry. Other cleaning products, such as hydrogen peroxide, can make the wound heal more slowly. You may cover the area with a gauze bandage if it weeps or rubs against clothing. Change the bandage every day. · Keep the area clean and dry. Follow-up care is a key part of your treatment and safety. Be sure to make and go to all appointments, and call your doctor if you are having problems. It's also a good idea to know your test results and keep a list of the medicines you take. When should you call for help? Call 911 anytime you think you may need emergency care. For example, call if:  · You passed out (lost consciousness). · You have severe trouble breathing. · You have a tight bulge in your neck on the side where the surgery was done. · You have symptoms of a stroke.  These may include:  ¨ Sudden numbness, tingling, weakness, or loss of movement in your face, arm, or leg, especially on only one side of your body. ¨ Sudden vision changes. ¨ Sudden trouble speaking. ¨ Sudden confusion or trouble understanding simple statements. ¨ Sudden problems with walking or balance. ¨ A sudden, severe headache that is different from past headaches. · You have chest pain or pressure. This may occur with:  ¨ Sweating. ¨ Shortness of breath. ¨ Nausea or vomiting. ¨ Pain that spreads from the chest to the neck, jaw, or one or both shoulders or arms. ¨ Dizziness or lightheadedness. ¨ A fast or uneven pulse. After calling 911, chew 1 adult-strength or 2 to 4 low-dose aspirin. Wait for an ambulance. Do not try to drive yourself. Call your doctor now or seek immediate medical care if:  · You are sick to your stomach or cannot keep fluids down. · You have pain that does not get better after you take pain medicine. · You have a fever over 100°F.  · You have loose stitches, or your incision comes open. · Bright red blood has soaked through the bandage over your incision. · You have signs of infection, such as:  ¨ Increased pain, swelling, warmth, or redness. ¨ Red streaks leading from the incisions. ¨ Pus draining from the incisions. ¨ Swollen lymph nodes in your neck, armpits, or groin. ¨ A fever. Watch closely for any changes in your health, and be sure to contact your doctor if you have any problems. Where can you learn more? Go to http://cindy-bella.info/. Enter A747 in the search box to learn more about \"Carotid Endarterectomy: What to Expect at Home. \"  Current as of: April 3, 2017  Content Version: 11.3  © 1251-0363 [employer]. Care instructions adapted under license by [employer] (which disclaims liability or warranty for this information).  If you have questions about a medical condition or this instruction, always ask your healthcare professional. [address] any warranty or liability for your use of this information. Stopping Smoking: Care Instructions  Your Care Instructions  Cigarette smokers crave the nicotine in cigarettes. Giving it up is much harder than simply changing a habit. Your body has to stop craving the nicotine. It is hard to quit, but you can do it. There are many tools that people use to quit smoking. You may find that combining tools works best for you. There are several steps to quitting. First you get ready to quit. Then you get support to help you. After that, you learn new skills and behaviors to become a nonsmoker. For many people, a necessary step is getting and using medicine. Your doctor will help you set up the plan that best meets your needs. You may want to attend a smoking cessation program to help you quit smoking. When you choose a program, look for one that has proven success. Ask your doctor for ideas. You will greatly increase your chances of success if you take medicine as well as get counseling or join a cessation program.  Some of the changes you feel when you first quit tobacco are uncomfortable. Your body will miss the nicotine at first, and you may feel short-tempered and grumpy. You may have trouble sleeping or concentrating. Medicine can help you deal with these symptoms. You may struggle with changing your smoking habits and rituals. The last step is the tricky one: Be prepared for the smoking urge to continue for a time. This is a lot to deal with, but keep at it. You will feel better. Follow-up care is a key part of your treatment and safety. Be sure to make and go to all appointments, and call your doctor if you are having problems. Its also a good idea to know your test results and keep a list of the medicines you take. How can you care for yourself at home? · Ask your family, friends, and coworkers for support.  You have a better chance of quitting if you have help and support. · Join a support group, such as Nicotine Anonymous, for people who are trying to quit smoking. · Consider signing up for a smoking cessation program, such as the American Lung Association's Freedom from Smoking program.  · Set a quit date. Pick your date carefully so that it is not right in the middle of a big deadline or stressful time. Once you quit, do not even take a puff. Get rid of all ashtrays and lighters after your last cigarette. Clean your house and your clothes so that they do not smell of smoke. · Learn how to be a nonsmoker. Think about ways you can avoid those things that make you reach for a cigarette. ¨ Avoid situations that put you at greatest risk for smoking. For some people, it is hard to have a drink with friends without smoking. For others, they might skip a coffee break with coworkers who smoke. ¨ Change your daily routine. Take a different route to work or eat a meal in a different place. · Cut down on stress. Calm yourself or release tension by doing an activity you enjoy, such as reading a book, taking a hot bath, or gardening. · Talk to your doctor or pharmacist about nicotine replacement therapy, which replaces the nicotine in your body. You still get nicotine but you do not use tobacco. Nicotine replacement products help you slowly reduce the amount of nicotine you need. These products come in several forms, many of them available over-the-counter:  ¨ Nicotine patches  ¨ Nicotine gum and lozenges  ¨ Nicotine inhaler  · Ask your doctor about bupropion (Wellbutrin) or varenicline (Chantix), which are prescription medicines. They do not contain nicotine. They help you by reducing withdrawal symptoms, such as stress and anxiety. · Some people find hypnosis, acupuncture, and massage helpful for ending the smoking habit. · Eat a healthy diet and get regular exercise. Having healthy habits will help your body move past its craving for nicotine.   · Be prepared to keep trying. Most people are not successful the first few times they try to quit. Do not get mad at yourself if you smoke again. Make a list of things you learned and think about when you want to try again, such as next week, next month, or next year. Where can you learn more? Go to http://cindy-bella.info/. Enter M467 in the search box to learn more about \"Stopping Smoking: Care Instructions. \"  Current as of: March 20, 2017  Content Version: 11.3  © 1002-2608 [employer]. Care instructions adapted under license by [employer] (which disclaims liability or warranty for this information). If you have questions about a medical condition or this instruction, always ask your healthcare professional. [address] any warranty or liability for your use of this information. Learning About Benefits From Quitting Smoking  How does quitting smoking make you healthier? If you're thinking about quitting smoking, you may have a few reasons to be smoke-free. Your health may be one of them. · When you quit smoking, you lower your risks for cancer, lung disease, heart attack, stroke, blood vessel disease, and blindness from macular degeneration. · When you're smoke-free, you get sick less often, and you heal faster. You are less likely to get colds, flu, bronchitis, and pneumonia. · As a nonsmoker, you may find that your mood is better and you are less stressed. When and how will you feel healthier? Quitting has real health benefits that start from day 1 of being smoke-free. And the longer you stay smoke-free, the healthier you get and the better you feel. The first hours  · After just 20 minutes, your blood pressure and heart rate go down. That means there's less stress on your heart and blood vessels. · Within 12 hours, the level of carbon monoxide in your blood drops back to normal. That makes room for more oxygen.  With more oxygen in your body, you may notice that you have more energy than when you smoked. After 2 weeks  · Your lungs start to work better. · Your risk of heart attack starts to drop. After 1 month  · When your lungs are clear, you cough less and breathe deeper, so it's easier to be active. · Your sense of taste and smell return. That means you can enjoy food more than you have since you started smoking. Over the years  · After 1 year, your risk of heart disease is half what it would be if you kept smoking. · After 5 years, your risk of stroke starts to shrink. Within a few years after that, it's about the same as if you'd never smoked. · After 10 years, your risk of dying from lung cancer is cut by about half. And your risk for many other types of cancer is lower too. How would quitting help others in your life? When you quit smoking, you improve the health of everyone who now breathes in your smoke. · Their heart, lung, and cancer risks drop, much like yours. · They are sick less. For babies and small children, living smoke-free means they're less likely to have ear infections, pneumonia, and bronchitis. · If you're a woman who is or will be pregnant someday, quitting smoking means a healthier . · Children who are close to you are less likely to become adult smokers. Where can you learn more? Go to http://cindy-bella.info/. Enter 052 [id number] [id number] 11 in the search box to learn more about \"Learning About Benefits From Quitting Smoking. \"  Current as of: 2017  Content Version: 11.3  © 0009-2811 [employer]. Care instructions adapted under license by [employer] (which disclaims liability or warranty for this information). If you have questions about a medical condition or this instruction, always ask your healthcare professional. [place] [address] any warranty or liability for your use of this information.     DISCHARGE SUMMARY from Nurse    The following personal items are in your possession at time of discharge:    Dental Appliances: None  Visual Aid: None     Home Medications: None  Jewelry: None  Clothing: None, Other (comment) (sent home with family)  Other Valuables: None  Personal Items Sent to Safe: none          PATIENT INSTRUCTIONS:    After general anesthesia or intravenous sedation, for 24 hours or while taking prescription Narcotics:  · Limit your activities  · Do not drive and operate hazardous machinery  · Do not make important personal or business decisions  · Do  not drink alcoholic beverages  · If you have not urinated within 8 hours after discharge, please contact your surgeon on call. Report the following to your surgeon:  · Excessive pain, swelling, redness or odor of or around the surgical area  · Temperature over 100.5  · Nausea and vomiting lasting longer than 4 hours or if unable to take medications  · Any signs of decreased circulation or nerve impairment to extremity: change in color, persistent  numbness, tingling, coldness or increase pain  · Any questions        What to do at Home:  Recommended activity: No lifting, Driving, or Strenuous exercise     If you experience any of the following symptoms see dischar geinstructions, please follow up with surgeon. *  Please give a list of your current medications to your Primary Care Provider. *  Please update this list whenever your medications are discontinued, doses are      changed, or new medications (including over-the-counter products) are added. *  Please carry medication information at all times in case of emergency situations. These are general instructions for a healthy lifestyle:    No smoking/ No tobacco products/ Avoid exposure to second hand smoke    Surgeon General's Warning:  Quitting smoking now greatly reduces serious risk to your health.     Obesity, smoking, and sedentary lifestyle greatly increases your risk for illness    A healthy diet, regular physical exercise & weight monitoring are important for maintaining a healthy lifestyle    You may be retaining fluid if you have a history of heart failure or if you experience any of the following symptoms:  Weight gain of 3 pounds or more overnight or 5 pounds in a week, increased swelling in our hands or feet or shortness of breath while lying flat in bed. Please call your doctor as soon as you notice any of these symptoms; do not wait until your next office visit. Recognize signs and symptoms of STROKE:    F-face looks uneven    A-arms unable to move or move unevenly    S-speech slurred or non-existent    T-time-call 911 as soon as signs and symptoms begin-DO NOT go       Back to bed or wait to see if you get better-TIME IS BRAIN. Warning Signs of HEART ATTACK     Call 911 if you have these symptoms:   Chest discomfort. Most heart attacks involve discomfort in the center of the chest that lasts more than a few minutes, or that goes away and comes back. It can feel like uncomfortable pressure, squeezing, fullness, or pain.  Discomfort in other areas of the upper body. Symptoms can include pain or discomfort in one or both arms, the back, neck, jaw, or stomach.  Shortness of breath with or without chest discomfort.  Other signs may include breaking out in a cold sweat, nausea, or lightheadedness. Don't wait more than five minutes to call 911 - MINUTES MATTER! Fast action can save your life. Calling 911 is almost always the fastest way to get lifesaving treatment. Emergency Medical Services staff can begin treatment when they arrive -- up to an hour sooner than if someone gets to the hospital by car. The discharge information has been reviewed with the patient. The patient verbalized understanding. Discharge medications reviewed with the patient and appropriate educational materials and side effects teaching were provided.

## 2017-10-19 NOTE — PROGRESS NOTES
Discharge order noted.  made aware PT is recommending home PT.   Will defer instructions until arrangements are made

## 2017-10-19 NOTE — PROGRESS NOTES
Alex Lugo   [address] [place] [address] FAX: [phone]      Vascular Surgery Progress Note    Admit Date: 10/17/2017  POD 2 Days Post-Op    Procedure:  Procedure(s):  LEFT CAROTID ENDARTERECTOMY      Subjective:     Patient has no new complaints. Objective:     Blood pressure 140/79, pulse 89, temperature 97.5 °F (36.4 °C), resp. rate 20, height 5' 9\" (1.753 m), weight 181 lb 3.2 oz (82.2 kg), SpO2 98 %. Temp (24hrs), Av.5 °F (36.9 °C), Min:97.5 °F (36.4 °C), Max:99.3 °F (37.4 °C)      Physical Exam:  GENERAL: alert, cooperative, no distress, appears stated age, LUNG:  clear to auscultation bilaterally, HEART:  regular rate and rhythm, S1, S2 normal, no murmur, click, rub or gallop, NEURO:  AOx3. Reflexes and motor strength normal and symmetric. Cranial nerves 2-12 and sensation grossly intact. and INCISION:  neck  dressing in place    Labs: No results for input(s): HGB, WBC, K, GLU, HGBEXT in the last 72 hours.     Data Review: images and reports reviewed  Current Facility-Administered Medications   Medication Dose Route Frequency    influenza vaccine - (3 yrs+)(PF) (FLUZONE QUAD/FLUARIX QUAD) injection 0.5 mL  0.5 mL IntraMUSCular PRIOR TO DISCHARGE    albuterol (PROVENTIL HFA, VENTOLIN HFA, PROAIR HFA) inhaler 1 Puff  1 Puff Inhalation Q6H PRN    atorvastatin (LIPITOR) tablet 40 mg  40 mg Oral QHS    gabapentin (NEURONTIN) capsule 300 mg  300 mg Oral PRN    glimepiride (AMARYL) tablet 2 mg  2 mg Oral 7am    metFORMIN (GLUCOPHAGE) tablet 1,000 mg  1,000 mg Oral BID WITH MEALS    nicotine (NICODERM CQ) 21 mg/24 hr patch 1 Patch  1 Patch TransDERmal DAILY    alum-mag hydroxide-simeth (MYLANTA) oral suspension 30 mL  30 mL Oral Q4H PRN    lidocaine (XYLOCAINE) 10 mg/mL (1 %) injection 0.1 mL  0.1 mL SubCUTAneous PRN    midazolam (VERSED) injection 2 mg  2 mg IntraVENous ONCE PRN    carvedilol (COREG) tablet 25 mg  25 mg Oral BID WITH MEALS    lisinopril (PRINIVIL, ZESTRIL) tablet 5 mg  5 mg Oral 7am    amLODIPine (NORVASC) tablet 10 mg  10 mg Oral 7am    dextrose 5% and 0.9% NaCl infusion  50 mL/hr IntraVENous CONTINUOUS    sodium chloride (NS) flush 5-10 mL  5-10 mL IntraVENous Q8H    sodium chloride (NS) flush 5-10 mL  5-10 mL IntraVENous PRN    acetaminophen (TYLENOL) tablet 650 mg  650 mg Oral Q4H PRN    oxyCODONE-acetaminophen (PERCOCET) 5-325 mg per tablet 1 Tab  1 Tab Oral Q4H PRN    morphine injection 5 mg  5 mg IntraVENous Q3H PRN    naloxone (NARCAN) injection 0.4 mg  0.4 mg IntraVENous PRN    ondansetron (ZOFRAN) injection 4 mg  4 mg IntraVENous Q4H PRN    aspirin delayed-release tablet 81 mg  81 mg Oral DAILY     Assessment:     Principal Problem:    Carotid stenosis (10/5/2017)      Overview: R CEA performed by Dr. Ashley Gabriel at [facility] in 2015    Active Problems:    DM [facility]di[facility] mellitus) ([facility] (10/5/2017)      HTN (hypertension) (10/5/2017)      Status post carotid endarterectomy (10/17/2017)        Plan/Recommendations/Medical Decision Making:     Continue present treatment  D/C home today- [employer]  to follow as an outpatient  Information given to the patient about affordable housing by inpatient     Signed By: Mile Chavez NP     October 19, 2017

## 2017-10-19 NOTE — PROGRESS NOTES
Problem: Mobility Impaired (Adult and Pediatric)  Goal: *Acute Goals and Plan of Care (Insert Text)  STG:  (1.)Mr. Vikki Altman will move from supine to sit and sit to supine  and roll side to side with CONTACT GUARD ASSIST within 3 day(s). (2.)Mr. Vikki Altman will transfer from bed to chair and chair to bed with CONTACT GUARD ASSIST using the least restrictive device within 3 day(s). (3.)Mr. Vikki Altman will ambulate with CONTACT GUARD ASSIST for 50 feet with the least restrictive device within 3 day(s). LTG:  (1.)Mr. Vikki Altman will move from supine to sit and sit to supine , scoot up and down and roll side to side in bed with SUPERVISION within 7 day(s). (2.)Mr. Vikki Altman will transfer from bed to chair and chair to bed with SUPERVISION using the least restrictive device within 7 day(s). (3.)Mr. Vikki Altman will ambulate with SUPERVISION for 150 feet with the least restrictive device within 7 day(s). ________________________________________________________________________________________________    PHYSICAL THERAPY: Daily Note, Treatment Day: 1st, AM 10/19/2017  INPATIENT: Hospital Day: 3  Payor: Kika Abreu / Plan: [address] [place] [address] HMO / Product Type: Managed Care Medicare /      NAME/AGE/GENDER: Luz Velasco is a 60 y.o. male   PRIMARY DIAGNOSIS: Carotid stenosis, right [I65.21] Carotid stenosis Carotid stenosis  Procedure(s) (LRB):  LEFT CAROTID ENDARTERECTOMY (Left)  2 Days Post-Op  ICD-10: Treatment Diagnosis:   · Generalized Muscle Weakness (M62.81)  · Other lack of cordination (R27.8)   Precaution/Allergies:  Pravastatin      ASSESSMENT:     Mr. Vikki Altman was sitting in recliner chair on arrival and agreeable to PT treatment. Patient has good strength on R side and weaker on L side. Patient improved mobility today and was able to stand with stand by assist.  Patient also ambulated in hallway for 75 feet with slow gait. Patient returned to room and commented he would be discharged home today. Recommended at least [facility] PT referral due to frequent falls recently and patient agreed. Good progress noted, will continue to treat if patient remains in hospital.      This section established at most recent assessment   PROBLEM LIST (Impairments causing functional limitations):  1. Decreased Strength  2. Decreased ADL/Functional Activities  3. Decreased Transfer Abilities  4. Decreased Ambulation Ability/Technique  5. Decreased Balance  6. Decreased Activity Tolerance  7. Decreased Knowledge of Precautions  8. Decreased [place] with Home Exercise Program   INTERVENTIONS PLANNED: (Benefits and precautions of physical therapy have been discussed with the patient.)  1. Balance Exercise  2. Bed Mobility  3. Family Education  4. Gait Training  5. Home Exercise Program (HEP)  6. Therapeutic Activites  7. Therapeutic Exercise/Strengthening  8. Transfer Training  9. Group Therapy     TREATMENT PLAN: Frequency/Duration: daily for duration of hospital stay  Rehabilitation Potential For Stated Goals: Excellent     RECOMMENDED REHABILITATION/EQUIPMENT: (at time of discharge pending progress): Due to the probability of continued deficits (see above) this patient will likely need continued skilled physical therapy after discharge. Equipment:    to be determined              HISTORY:   History of Present Injury/Illness (Reason for Referral):   pt is status post above  Past Medical History/Comorbidities:   Mr. Ashley Leroy  has a past medical history of Allergic rhinitis; Anxiety; Carotid stenosis; Chronic obstructive pulmonary disease ([facility] [address] Chronic pain; Depression; ED (erectile dysfunction); H/O cardiac murmur; Hip bursitis; Hypercholesterolemia; Hypertension; Osteoarthritis; Stroke [facility]) ([id number], 2017); Tobacco abuse; and Type 2 diabetes mellitus ([facility] [address] Mr. Ashley Leroy  has a past surgical history that includes hernia repair (Left) and carotid endarterectomy (Right, 2014).   Social History/Living Environment:   Home Environment: Other (comment) (32 foot camper)  # Steps to Enter: 1  One/Two Story Residence: One story  Living Alone: Yes  Support Systems: Other (comments) (Poor support system in place)  Patient Expects to be Discharged to[de-identified] Assisted living (Patient hopes to be able to transition to assisted living)  Current DME Used/Available at Home: Cane, quad (has a cane but will not use it)  Prior Level of Function/Work/Activity:  Patient lives in a camper with no running water. Patient uses a cane for mobility in home and needs assistance with grocery shopping. Patient has fallen multiple times recenlty  Previous Treatment Approaches:          HH PT; however, recent decline in balance   Number of Personal Factors/Comorbidities that affect the Plan of Care: 3+: HIGH COMPLEXITY   EXAMINATION:   Most Recent Physical Functioning:   Gross Assessment:  AROM: Generally decreased, functional  Strength: Generally decreased, functional (L LE grossly 3+/5, L UE 3/5, weakness from old CVA)  Coordination: Generally decreased, functional  Sensation: Impaired (pt reports mild numbness L LE)               Posture:  Posture (WDL): Exceptions to WDL  Posture Assessment: Forward head  Balance:  Sitting: Intact  Standing: Impaired  Standing - Static: Good  Standing - Dynamic : Fair Bed Mobility:     Wheelchair Mobility:     Transfers:  Sit to Stand: Stand-by asssistance  Stand to Sit: Stand-by asssistance  Gait:     Base of Support: Widened  Speed/Erica: Slow  Step Length: Right shortened  Gait Abnormalities: Altered arm swing;Decreased step clearance; Path deviations  Distance (ft): 75 Feet (ft)  Assistive Device: Walker yobani  Ambulation - Level of Assistance: Contact guard assistance  Interventions: Verbal cues; Tactile cues; Safety awareness training;Manual cues      Body Structures Involved:  1. Muscles Body Functions Affected:  1. Neuromusculoskeletal Activities and Participation Affected:  1.  General Tasks and Demands  2. Mobility  3. Self Care  4. Domestic Life   Number of elements that affect the Plan of Care: 4+: HIGH COMPLEXITY   CLINICAL PRESENTATION:   Presentation: Stable and uncomplicated: LOW COMPLEXITY   CLINICAL DECISION MAKIN [facility] [address] [id number] AM-PAC 6 Clicks   Basic Mobility Inpatient Short Form  How much difficulty does the patient currently have. .. Unable A Lot A Little None   1. Turning over in bed (including adjusting bedclothes, sheets and blankets)? [] 1   [] 2   [x] 3   [] 4   2. Sitting down on and standing up from a chair with arms ( e.g., wheelchair, bedside commode, etc.)   [] 1   [] 2   [x] 3   [] 4   3. Moving from lying on back to sitting on the side of the bed? [] 1   [] 2   [x] 3   [] 4   How much help from another person does the patient currently need. .. Total A Lot A Little None   4. Moving to and from a bed to a chair (including a wheelchair)? [] 1   [] 2   [x] 3   [] 4   5. Need to walk in hospital room? [] 1   [] 2   [x] 3   [] 4   6. Climbing 3-5 steps with a railing? [] 1   [x] 2   [] 3   [] 4   © , Trustees of [address] [facility] [address] [address], under license to [employer]. All rights reserved      Score:  Initial: 17 Most Recent: X (Date: -- )    Interpretation of Tool:  Represents activities that are increasingly more difficult (i.e. Bed mobility, Transfers, Gait). Score 24 23 22-20 19-15 14-10 9-7 6     Modifier CH CI CJ CK CL CM CN      ? Mobility - Walking and Moving Around:     - CURRENT STATUS: CK - 40%-59% impaired, limited or restricted    - GOAL STATUS: CJ - 20%-39% impaired, limited or restricted    - D/C STATUS:  ---------------To be determined---------------  Payor: Yvette Knight / Plan: [address] [place] [address] HMO / Product Type: Managed Care Medicare /      Medical Necessity:     · Patient demonstrates good rehab potential due to higher previous functional level.   · Skilled intervention continues to be required due to decline in overall functional mobility. Reason for Services/Other Comments:  · Patient continues to require present interventions due to patient's inability to perform functional mobility at previous indepencence level. Use of outcome tool(s) and clinical judgement create a POC that gives a: Clear prediction of patient's progress: LOW COMPLEXITY            TREATMENT:   (In addition to Assessment/Re-Assessment sessions the following treatments were rendered)   Pre-treatment Symptoms/Complaints: \"Been falling a lot\"  Pain: Initial:   Pain Intensity 1: 0  Pain Intervention(s) 1: Ambulation/Increased Activity, Nurse notified, Repositioned  Post Session:  None noted     Therapeutic Activity: (    10 Minutes): Therapeutic activities including Bed transfers, Chair transfers, Ambulation on level ground and safety with mobility to improve mobility, strength and balance. Required minimal Verbal cues; Tactile cues; Safety awareness training;Manual cues to promote coordination of lower extremity(s) and promote motor control of lower extremity(s). Braces/Orthotics/Lines/Etc:   · O2 Device: Room air  Treatment/Session Assessment:    · Response to Treatment:  Tolerated well  · Interdisciplinary Collaboration:   o Physical Therapist  o Registered Nurse  · After treatment position/precautions:   o Up in chair  o Call light within reach  o RN notified  o being transferred to 7th floor   · Compliance with Program/Exercises: compliant all of the time. · Recommendations/Intent for next treatment session: \"Next visit will focus on advancements to more challenging activities and reduction in assistance provided\".   Total Treatment Duration:PT Patient Time In/Time Out  Time In: 1120  Time Out: 200 [employer] [facility], DPT

## 2017-10-23 ENCOUNTER — HOME CARE VISIT (OUTPATIENT)
Dept: SCHEDULING | Facility: HOME HEALTH | Age: 62
End: 2017-10-23
Payer: MEDICARE

## 2017-10-23 PROCEDURE — 3331090001 HH PPS REVENUE CREDIT

## 2017-10-23 PROCEDURE — G0151 HHCP-SERV OF PT,EA 15 MIN: HCPCS

## 2017-10-23 PROCEDURE — 400013 HH SOC

## 2017-10-23 PROCEDURE — 3331090002 HH PPS REVENUE DEBIT

## 2017-10-24 VITALS
WEIGHT: 182 LBS | RESPIRATION RATE: 16 BRPM | DIASTOLIC BLOOD PRESSURE: 82 MMHG | HEIGHT: 67 IN | HEART RATE: 88 BPM | BODY MASS INDEX: 28.56 KG/M2 | SYSTOLIC BLOOD PRESSURE: 140 MMHG | TEMPERATURE: 97.9 F

## 2017-10-24 PROCEDURE — 3331090002 HH PPS REVENUE DEBIT

## 2017-10-24 PROCEDURE — 3331090001 HH PPS REVENUE CREDIT

## 2017-10-25 PROCEDURE — 3331090002 HH PPS REVENUE DEBIT

## 2017-10-25 PROCEDURE — 3331090001 HH PPS REVENUE CREDIT

## 2017-10-26 ENCOUNTER — HOME CARE VISIT (OUTPATIENT)
Dept: SCHEDULING | Facility: HOME HEALTH | Age: 62
End: 2017-10-26
Payer: MEDICARE

## 2017-10-26 VITALS
SYSTOLIC BLOOD PRESSURE: 156 MMHG | DIASTOLIC BLOOD PRESSURE: 82 MMHG | HEART RATE: 88 BPM | RESPIRATION RATE: 18 BRPM | TEMPERATURE: 98 F

## 2017-10-26 PROCEDURE — 3331090001 HH PPS REVENUE CREDIT

## 2017-10-26 PROCEDURE — G0151 HHCP-SERV OF PT,EA 15 MIN: HCPCS

## 2017-10-26 PROCEDURE — 3331090002 HH PPS REVENUE DEBIT

## 2017-10-27 PROCEDURE — 3331090002 HH PPS REVENUE DEBIT

## 2017-10-27 PROCEDURE — 3331090001 HH PPS REVENUE CREDIT

## 2017-10-28 PROCEDURE — 3331090001 HH PPS REVENUE CREDIT

## 2017-10-28 PROCEDURE — 3331090002 HH PPS REVENUE DEBIT

## 2017-10-29 PROCEDURE — 3331090001 HH PPS REVENUE CREDIT

## 2017-10-29 PROCEDURE — 3331090002 HH PPS REVENUE DEBIT

## 2017-10-30 ENCOUNTER — HOME CARE VISIT (OUTPATIENT)
Dept: HOME HEALTH SERVICES | Facility: HOME HEALTH | Age: 62
End: 2017-10-30
Payer: MEDICARE

## 2017-10-30 ENCOUNTER — HOME CARE VISIT (OUTPATIENT)
Dept: SCHEDULING | Facility: HOME HEALTH | Age: 62
End: 2017-10-30
Payer: MEDICARE

## 2017-10-30 PROCEDURE — 3331090001 HH PPS REVENUE CREDIT

## 2017-10-30 PROCEDURE — 3331090002 HH PPS REVENUE DEBIT

## 2017-10-30 PROCEDURE — 3331090003 HH PPS REVENUE ADJ

## 2017-10-31 PROCEDURE — 3331090002 HH PPS REVENUE DEBIT

## 2017-10-31 PROCEDURE — 3331090001 HH PPS REVENUE CREDIT

## 2017-11-01 PROCEDURE — 3331090002 HH PPS REVENUE DEBIT

## 2017-11-01 PROCEDURE — 3331090001 HH PPS REVENUE CREDIT

## 2017-11-02 PROCEDURE — 3331090002 HH PPS REVENUE DEBIT

## 2017-11-02 PROCEDURE — 3331090001 HH PPS REVENUE CREDIT

## 2017-11-03 PROCEDURE — 3331090001 HH PPS REVENUE CREDIT

## 2017-11-03 PROCEDURE — 3331090002 HH PPS REVENUE DEBIT

## 2017-11-04 PROCEDURE — 3331090002 HH PPS REVENUE DEBIT

## 2017-11-04 PROCEDURE — 3331090001 HH PPS REVENUE CREDIT

## 2020-05-25 ENCOUNTER — HOSPITAL ENCOUNTER (OUTPATIENT)
Dept: LAB | Age: 65
Discharge: HOME | End: 2020-05-25

## 2020-05-25 LAB
INR PPP: 1.8
PROTHROMBIN TIME: 21.2 SEC (ref 12–14.7)

## 2020-05-25 PROCEDURE — 85610 PROTHROMBIN TIME: CPT

## 2020-08-17 ENCOUNTER — HOSPITAL ENCOUNTER (OUTPATIENT)
Dept: LAB | Age: 65
Discharge: HOME | End: 2020-08-17

## 2020-08-17 LAB
INR PPP: 2
PROTHROMBIN TIME: 23.6 SEC (ref 12–14.7)

## 2020-08-17 PROCEDURE — 85610 PROTHROMBIN TIME: CPT

## 2022-01-24 PROCEDURE — 80053 COMPREHEN METABOLIC PANEL: CPT

## 2022-01-24 PROCEDURE — 85027 COMPLETE CBC AUTOMATED: CPT

## 2022-01-25 ENCOUNTER — HOSPITAL ENCOUNTER (OUTPATIENT)
Dept: LAB | Age: 67
Discharge: HOME | End: 2022-01-25

## 2022-01-25 LAB
ALBUMIN SERPL-MCNC: 2.9 G/DL (ref 3.2–4.6)
ALBUMIN/GLOB SERPL: 0.9 {RATIO} (ref 1.2–3.5)
ALP SERPL-CCNC: 134 U/L (ref 50–136)
ALT SERPL-CCNC: 42 U/L (ref 12–65)
ANION GAP SERPL CALC-SCNC: 15 MMOL/L (ref 7–16)
AST SERPL-CCNC: 47 U/L (ref 15–37)
BILIRUB SERPL-MCNC: 0.6 MG/DL (ref 0.2–1.1)
BUN SERPL-MCNC: 105 MG/DL (ref 8–23)
CALCIUM SERPL-MCNC: 8.6 MG/DL (ref 8.3–10.4)
CHLORIDE SERPL-SCNC: 103 MMOL/L (ref 98–107)
CO2 SERPL-SCNC: 19 MMOL/L (ref 21–32)
CREAT SERPL-MCNC: 8.43 MG/DL (ref 0.8–1.5)
ERYTHROCYTE [DISTWIDTH] IN BLOOD BY AUTOMATED COUNT: 15.3 % (ref 11.9–14.6)
GLOBULIN SER CALC-MCNC: 3.3 G/DL (ref 2.3–3.5)
GLUCOSE SERPL-MCNC: 142 MG/DL (ref 65–100)
HCT VFR BLD AUTO: 37.1 % (ref 41.1–50.3)
HGB BLD-MCNC: 11.7 G/DL (ref 13.6–17.2)
MCH RBC QN AUTO: 29 PG (ref 26.1–32.9)
MCHC RBC AUTO-ENTMCNC: 31.5 G/DL (ref 31.4–35)
MCV RBC AUTO: 91.8 FL (ref 79.6–97.8)
NRBC # BLD: 0 K/UL (ref 0–0.2)
PLATELET # BLD AUTO: 290 K/UL (ref 150–450)
PMV BLD AUTO: 10.4 FL (ref 9.4–12.3)
POTASSIUM SERPL-SCNC: 6.4 MMOL/L (ref 3.5–5.1)
PROT SERPL-MCNC: 6.2 G/DL (ref 6.3–8.2)
RBC # BLD AUTO: 4.04 M/UL (ref 4.23–5.6)
SODIUM SERPL-SCNC: 137 MMOL/L (ref 136–145)
WBC # BLD AUTO: 14.4 K/UL (ref 4.3–11.1)

## (undated) DEVICE — AGENT HEMSTAT W4XL4IN OXIDIZED REGENERATED CELOS ABSRB SFT

## (undated) DEVICE — SUT PROL 6-0 30IN C1 DA BLU --

## (undated) DEVICE — REM POLYHESIVE ADULT PATIENT RETURN ELECTRODE: Brand: VALLEYLAB

## (undated) DEVICE — INTENDED TO BE USED TO OCCLUDE, RETRACT AND IDENTIFY ARTERIES, VEINS, TENDONS AND NERVES IN SURGICAL PROCEDURES: Brand: STERION®  VESSEL LOOP

## (undated) DEVICE — APPLIER CLP L9.38IN M LIG TI DISP STR RNG HNDL LIGACLP

## (undated) DEVICE — SUTURE VCRL SZ 4-0 L27IN ABSRB UD L19MM PS-2 3/8 CIR PRIM J426H

## (undated) DEVICE — STANDARD HYPODERMIC NEEDLE,POLYPROPYLENE HUB: Brand: MONOJECT

## (undated) DEVICE — STERILE LATEX POWDER-FREE SURGICAL GLOVESWITH NITRILE COATING: Brand: PROTEXIS

## (undated) DEVICE — (D)PREP SKN CHLRAPRP APPL 26ML -- CONVERT TO ITEM 371833

## (undated) DEVICE — SUTURE VCRL SZ 2-0 L36IN ABSRB UD L36MM CT-1 1/2 CIR J945H

## (undated) DEVICE — MAGNETIC DRAPE: Brand: DEVON

## (undated) DEVICE — DRAIN WND RND SILICONE 15FR --

## (undated) DEVICE — SPONGE LAP 18X18IN STRL -- 5/PK

## (undated) DEVICE — BIPOLAR FORCEPS CORD,BANANA LEADS: Brand: VALLEYLAB

## (undated) DEVICE — (D)SYR 10ML 1/5ML GRAD NSAF -- PKGING CHANGE USE ITEM 338027

## (undated) DEVICE — SUTURE PROL SZ 6-0 L24IN NONABSORBABLE BLU L13MM C-1 3/8 8726H

## (undated) DEVICE — SINGLE BASIN: Brand: CARDINAL HEALTH

## (undated) DEVICE — Device

## (undated) DEVICE — CAROTID ARTERY SHUNT KIT,RADIOPAQUE LINE, STRAIGHT: Brand: ARGYLE

## (undated) DEVICE — DERMABOND SKIN ADH 0.7ML -- DERMABOND ADVANCED 12/BX

## (undated) DEVICE — SUTURE PROL SZ 7-0 L24IN NONABSORBABLE BLU L9.3MM BV-1 3/8 M8702

## (undated) DEVICE — SUTURE PERMAHAND SZ 0 L30IN NONABSORBABLE BLK L30MM PSL 3/8 590H

## (undated) DEVICE — SUTURE PROL SZ 5-0 L24IN NONABSORBABLE BLU L9.3MM BV-1 3/8 9702H

## (undated) DEVICE — INTENDED FOR TISSUE SEPARATION, AND OTHER PROCEDURES THAT REQUIRE A SHARP SURGICAL BLADE TO PUNCTURE OR CUT.: Brand: BARD-PARKER ® STAINLESS STEEL BLADES

## (undated) DEVICE — BENTSON WIRE GUIDE 20CM DISTAL FLEXIBILITY WITH SOFTENED TIP: Brand: BENTSON

## (undated) DEVICE — DRAPE TWL SURG 16X26IN BLU ORB04] ALLCARE INC]

## (undated) DEVICE — BLADE ASSEMB CLP HAIR FINE --

## (undated) DEVICE — FORCEPS BPLR L210MM TIP L1.5 WRK L105MM STR BAYNT INSUL DISP

## (undated) DEVICE — SUTURE PROL SZ 6-0 L30IN NONABSORBABLE BLU L9.3MM BV-1 3/8 M8709

## (undated) DEVICE — SUTURE PERMAHAND SZ 3-0 L18IN NONABSORBABLE BLK SILK BRAID A184H

## (undated) DEVICE — DISH PTRI STRL --

## (undated) DEVICE — UNIVERSAL DRAPES: Brand: MEDLINE INDUSTRIES, INC.

## (undated) DEVICE — SUTURE PERMAHAND SZ 2-0 L12X18IN NONABSORBABLE BLK SILK A185H

## (undated) DEVICE — PLEDGET VASC W3/16XL3/8IN THK1/16IN PTFE SFT

## (undated) DEVICE — GOWN,REINFORCED,POLY,AURORA,XXLARGE,STR: Brand: MEDLINE

## (undated) DEVICE — CLAMP INSERT: Brand: STEALTH® FIBRA® CLAMP INSERT

## (undated) DEVICE — SHUNT CV L30CM DIA4X5MM SIL EXT LOOP FULL SPR REINF SUNDT

## (undated) DEVICE — CARDINAL HEALTH FLEXIBLE LIGHT HANDLE COVER: Brand: CARDINAL HEALTH

## (undated) DEVICE — GOWN,PREVENTION PLUS,2XL,ST,22/CS: Brand: MEDLINE

## (undated) DEVICE — KENDALL SCD EXPRESS SLEEVES, KNEE LENGTH, MEDIUM: Brand: KENDALL SCD

## (undated) DEVICE — GEL US 20GM NONIRRITATING OVERWRAPPED FILE PCH TRNSMIT

## (undated) DEVICE — 2000CC GUARDIAN II: Brand: GUARDIAN

## (undated) DEVICE — 3M™ IOBAN™ 2 ANTIMICROBIAL INCISE DRAPE 6650EZ: Brand: IOBAN™ 2

## (undated) DEVICE — SOLUTION IV 1000ML 0.9% SOD CHL